# Patient Record
Sex: FEMALE | Race: WHITE | NOT HISPANIC OR LATINO | Employment: FULL TIME | URBAN - NONMETROPOLITAN AREA
[De-identification: names, ages, dates, MRNs, and addresses within clinical notes are randomized per-mention and may not be internally consistent; named-entity substitution may affect disease eponyms.]

---

## 2017-08-21 ENCOUNTER — OFFICE VISIT (OUTPATIENT)
Dept: FAMILY MEDICINE | Facility: OTHER | Age: 47
End: 2017-08-21
Payer: COMMERCIAL

## 2017-08-21 VITALS
WEIGHT: 245 LBS | RESPIRATION RATE: 20 BRPM | TEMPERATURE: 97 F | BODY MASS INDEX: 36.29 KG/M2 | HEIGHT: 69 IN | HEART RATE: 80 BPM | DIASTOLIC BLOOD PRESSURE: 76 MMHG | SYSTOLIC BLOOD PRESSURE: 120 MMHG

## 2017-08-21 DIAGNOSIS — B35.1 ONYCHOMYCOSIS: Primary | ICD-10-CM

## 2017-08-21 DIAGNOSIS — R21 RASH AND NONSPECIFIC SKIN ERUPTION: ICD-10-CM

## 2017-08-21 DIAGNOSIS — Z79.899 HIGH RISK MEDICATION USE: ICD-10-CM

## 2017-08-21 LAB
ALBUMIN SERPL-MCNC: 3.5 G/DL (ref 3.4–5)
ALP SERPL-CCNC: 74 U/L (ref 40–150)
ALT SERPL W P-5'-P-CCNC: 20 U/L (ref 0–50)
AST SERPL W P-5'-P-CCNC: 16 U/L (ref 0–45)
BILIRUB DIRECT SERPL-MCNC: 0.1 MG/DL (ref 0–0.2)
BILIRUB SERPL-MCNC: 0.3 MG/DL (ref 0.2–1.3)
PROT SERPL-MCNC: 7.2 G/DL (ref 6.8–8.8)

## 2017-08-21 PROCEDURE — 36415 COLL VENOUS BLD VENIPUNCTURE: CPT | Performed by: NURSE PRACTITIONER

## 2017-08-21 PROCEDURE — 99213 OFFICE O/P EST LOW 20 MIN: CPT | Performed by: NURSE PRACTITIONER

## 2017-08-21 PROCEDURE — 80076 HEPATIC FUNCTION PANEL: CPT | Performed by: NURSE PRACTITIONER

## 2017-08-21 RX ORDER — TERBINAFINE HYDROCHLORIDE 250 MG/1
250 TABLET ORAL DAILY
Qty: 90 TABLET | Refills: 0 | Status: SHIPPED | OUTPATIENT
Start: 2017-08-21 | End: 2021-01-21

## 2017-08-21 RX ORDER — TRIAMCINOLONE ACETONIDE 1 MG/G
CREAM TOPICAL
Qty: 80 G | Refills: 0 | Status: SHIPPED | OUTPATIENT
Start: 2017-08-21 | End: 2021-01-21

## 2017-08-21 NOTE — NURSING NOTE
"Chief Complaint   Patient presents with     Fungal Infection     left foot, 2 toes       Initial /76  Pulse 80  Temp 97  F (36.1  C) (Tympanic)  Resp 20  Ht 5' 8.7\" (1.745 m)  Wt 245 lb (111.1 kg)  LMP 01/21/2014  Breastfeeding? No  BMI 36.5 kg/m2 Estimated body mass index is 36.5 kg/(m^2) as calculated from the following:    Height as of this encounter: 5' 8.7\" (1.745 m).    Weight as of this encounter: 245 lb (111.1 kg).  Medication Reconciliation: complete   ................Salomón Gonzalez LPN,   August 21, 2017,      11:46 AM,   [facility]    "

## 2017-08-21 NOTE — MR AVS SNAPSHOT
After Visit Summary   8/21/2017    Parrish Bray    MRN: [id number]           Patient Information     Date Of Birth          1970        Visit Information        Provider Department      8/21/2017 11:40 AM Bambi Mojica APRN CNP [facility]        Today's Diagnoses     Onychomycosis    -  1    Rash and nonspecific skin eruption          Care Instructions    Start the medication today.     Have your liver tested again in 6 weeks with a lab only blood test appointment.     Use the steroid cream until the rash is gone.  Use Benadryl for the itching.            Follow-ups after your visit        Future tests that were ordered for you today     Open Future Orders        Priority Expected Expires Ordered    **Hepatic panel FUTURE 2mo Routine 10/2/2017 12/19/2017 8/21/2017            Who to contact     If you have questions or need follow up information about today's clinic visit or your schedule please contact [facility] directly at [phone].  Normal or non-critical lab and imaging results will be communicated to you by [employer], letter or phone within 4 business days after the clinic has received the results. If you do not hear from us within 7 days, please contact the clinic through [employer] or phone. If you have a critical or abnormal lab result, we will notify you by phone as soon as possible.  Submit refill requests through [employer] or call your pharmacy and they will forward the refill request to us. Please allow 3 business days for your refill to be completed.          Additional Information About Your Visit        [employer] Information     [employer] gives you secure access to your electronic health record. If you see a primary care provider, you can also send messages to your care team and make appointments. If you have questions, please call your primary care clinic.  If you do not have a primary care provider, please call [phone] and they will assist you.    "     Care EveryWhere ID     This is your Care EveryWhere ID. This could be used by other organizations to access your [facility] medical records  [id number]        Your Vitals Were     Pulse Temperature Respirations Height Last Period Breastfeeding?    80 97  F (36.1  C) (Tympanic) 20 5' 8.7\" (1.745 m) 01/21/2014 No    BMI (Body Mass Index)                   36.5 kg/m2            Blood Pressure from Last 3 Encounters:   08/21/17 120/76   01/27/16 122/78   01/12/16 126/78    Weight from Last 3 Encounters:   08/21/17 245 lb (111.1 kg)   01/27/16 255 lb 11.2 oz (116 kg)   01/12/16 255 lb (115.7 kg)              We Performed the Following     Hepatic panel (Albumin, ALT, AST, Bili, Alk Phos, TP)          Today's Medication Changes          These changes are accurate as of: 8/21/17 12:23 PM.  If you have any questions, ask your nurse or doctor.               Start taking these medicines.        Dose/Directions    terbinafine 250 MG tablet   Commonly known as:  lamISIL   Used for:  Onychomycosis   Started by:  Bambi Mojica APRN CNP        Dose:  250 mg   Take 1 tablet (250 mg) by mouth daily   Quantity:  90 tablet   Refills:  0       triamcinolone 0.1 % cream   Commonly known as:  KENALOG   Used for:  Rash and nonspecific skin eruption   Started by:  Bambi Mojica APRN CNP        Apply sparingly to affected area three times daily as needed   Quantity:  80 g   Refills:  0            Where to get your medicines      These medications were sent to [facility] [place], [place] - [address]   [address] [facility] [address]     Phone:  [phone]     terbinafine 250 MG tablet    triamcinolone 0.1 % cream                Primary Care Provider Office Phone # Fax #    Kimo Phillips [phone]       [address] [facility] [address]        Equal Access to Services     DIMA CALHOUN AH: Hadii aad ku hadasho Soomaali, waaxda luqadaha, qaybta kaalmachristy macedo, tati styles " lafifi holloway. So [facility] [phone].    ATENCIÓN: Si habla jeevan, tiene a david disposición servicios gratuitos de asistencia lingüística. Claritza al [phone].    We comply with applicable federal civil rights laws and [place] laws. We do not discriminate on the basis of race, color, national origin, age, disability sex, sexual orientation or gender identity.            Thank you!     Thank you for choosing [facility]  for your care. Our goal is always to provide you with excellent care. Hearing back from our patients is one way we can continue to improve our services. Please take a few minutes to complete the written survey that you may receive in the mail after your visit with us. Thank you!             Your Updated Medication List - Protect others around you: Learn how to safely use, store and throw away your medicines at www.disposemymeds.org.          This list is accurate as of: 8/21/17 12:23 PM.  Always use your most recent med list.                   Brand Name Dispense Instructions for use Diagnosis    terbinafine 250 MG tablet    lamISIL    90 tablet    Take 1 tablet (250 mg) by mouth daily    Onychomycosis       triamcinolone 0.1 % cream    KENALOG    80 g    Apply sparingly to affected area three times daily as needed    Rash and nonspecific skin eruption

## 2017-08-21 NOTE — PROGRESS NOTES
SUBJECTIVE:   Parrish Bray is a 47 year old female who presents to clinic today for the following health issues:      TOE FUNGUS - 2 TOENAILS  -has tried otc meds for 1 yr without improvement.     She also has a facial rash.  Noticed it after doing yard work this week. It is very itchy, no drainage.     No fevers/chills.     Problem list and histories reviewed & adjusted, as indicated.  Additional history: none    Patient Active Problem List   Diagnosis     Allergic rhinitis     CARDIOVASCULAR SCREENING; LDL GOAL LESS THAN 160     Menorrhagia     Endometriosis     Past Surgical History:   Procedure Laterality Date     ABDOMEN SURGERY       CYSTOSCOPY  2/7/2014    Procedure: CYSTOSCOPY;;  Surgeon: Jessy Palomares MD;  Location: [facility] OR      TOOTH EXTRACTION W/FORCEP      x5 wisdom teeth.     HYSTERECTOMY TOTAL ABDOMINAL, BILATERAL SALPINGO-OOPHORECTOMY, COMBINED  2/7/2014    Procedure: COMBINED HYSTERECTOMY TOTAL ABDOMINAL, SALPINGO-OOPHORECTOMY;;  Surgeon: Jessy Palomares MD;  Location: [facility] OR     HYSTERECTOMY, PAP NO LONGER INDICATED       LAPAROSCOPY DIAGNOSTIC (GYN)  2/7/2014    Procedure: LAPAROSCOPY DIAGNOSTIC (GYN);  Diagnositc Laparoscopy with Lysis of  Adhesions,  Exploratory Laparotomy, Total Abdominal   Hysterectomy Bilateral Salpingo-Oopherctomy, Lysis of Adhesions(open), Ureterolysis,Cystoscopy, Protoscopy.  Anesthesia General with Block;  Surgeon: Jessy Palomares MD;  Location: [facility] OR       Social History   Substance Use Topics     Smoking status: Never Smoker     Smokeless tobacco: Never Used      Comment: no smoking in the home     Alcohol use Yes      Comment: Once or twice a month     Family History   Problem Relation Age of Onset     CANCER Father      prostate,and bladder     HEART DISEASE Father      stents     Arthritis Father      Coronary Artery Disease Father      Hypertension Father      Prostate Cancer Father      Other Cancer Father      Bladder     CEREBROVASCULAR DISEASE Father   "    Hypertension Mother      CANCER Son      cancer of the sinus, rhabdomyoscarcoma     Breast Cancer Maternal Aunt          Current Outpatient Prescriptions   Medication Sig Dispense Refill     No medications                 Allergies   Allergen Reactions     Sulfa Drugs Rash     BP Readings from Last 3 Encounters:   08/21/17 120/76   01/27/16 122/78   01/12/16 126/78    Wt Readings from Last 3 Encounters:   08/21/17 245 lb (111.1 kg)   01/27/16 255 lb 11.2 oz (116 kg)   01/12/16 255 lb (115.7 kg)            Reviewed and updated as needed this visit by clinical staffTobacco  Allergies  Meds  Med Hx  Surg Hx  Fam Hx  Soc Hx      Reviewed and updated as needed this visit by Provider         ROS:  C: NEGATIVE for fever, chills, change in weight  INTEGUMENTARY/SKIN: facial rash as above, toenail fungus as above   E/M: NEGATIVE for ear, mouth and throat problems  R: NEGATIVE for significant cough or SOB  CV: NEGATIVE for chest pain, palpitations or peripheral edema    OBJECTIVE:     /76  Pulse 80  Temp 97  F (36.1  C) (Tympanic)  Resp 20  Ht 5' 8.7\" (1.745 m)  Wt 245 lb (111.1 kg)  LMP 01/21/2014  Breastfeeding? No  BMI 36.5 kg/m2  Body mass index is 36.5 kg/(m^2).  GENERAL: healthy, alert and no distress  SKIN: raised, red rash on the right side of her face, consistent with a plant based dermatitis. onychomycosis right great toe.     Diagnostic Test Results:  none     ASSESSMENT/PLAN:         1. Onychomycosis  Discussed treatment options.  She wants to try oral treatments.  Discussed risks/benefits.  Will check liver function today and 6 weeks into treatment.   - terbinafine (LAMISIL) 250 MG tablet; Take 1 tablet (250 mg) by mouth daily  Dispense: 90 tablet; Refill: 0  - Hepatic panel (Albumin, ALT, AST, Bili, Alk Phos, TP)  - **Hepatic panel FUTURE 2mo; Future    2. Rash and nonspecific skin eruption  Likely a plant based dermatitis.  Will treat with steroid cream as prescribed.   - triamcinolone " (KENALOG) 0.1 % cream; Apply sparingly to affected area three times daily as needed  Dispense: 80 g; Refill: 0    3. High risk medication use  - Hepatic panel (Albumin, ALT, AST, Bili, Alk Phos, TP)  - Hepatic panel FUTURE 2mo; Future    See Patient Instructions    MIRIAM Hernandez [facility]

## 2017-08-21 NOTE — PATIENT INSTRUCTIONS
Start the medication today.     Have your liver tested again in 6 weeks with a lab only blood test appointment.     Use the steroid cream until the rash is gone.  Use Benadryl for the itching.

## 2017-10-11 DIAGNOSIS — Z79.899 HIGH RISK MEDICATION USE: ICD-10-CM

## 2017-10-11 DIAGNOSIS — B35.1 ONYCHOMYCOSIS: ICD-10-CM

## 2017-10-11 LAB
ALBUMIN SERPL-MCNC: 3.8 G/DL (ref 3.4–5)
ALP SERPL-CCNC: 71 U/L (ref 40–150)
ALT SERPL W P-5'-P-CCNC: 20 U/L (ref 0–50)
AST SERPL W P-5'-P-CCNC: 13 U/L (ref 0–45)
BILIRUB DIRECT SERPL-MCNC: <0.1 MG/DL (ref 0–0.2)
BILIRUB SERPL-MCNC: 0.2 MG/DL (ref 0.2–1.3)
PROT SERPL-MCNC: 7.6 G/DL (ref 6.8–8.8)

## 2017-10-11 PROCEDURE — 80076 HEPATIC FUNCTION PANEL: CPT | Performed by: NURSE PRACTITIONER

## 2017-10-11 PROCEDURE — 36415 COLL VENOUS BLD VENIPUNCTURE: CPT | Performed by: NURSE PRACTITIONER

## 2019-09-28 ENCOUNTER — HEALTH MAINTENANCE LETTER (OUTPATIENT)
Age: 49
End: 2019-09-28

## 2020-03-15 ENCOUNTER — HEALTH MAINTENANCE LETTER (OUTPATIENT)
Age: 50
End: 2020-03-15

## 2021-01-10 ENCOUNTER — HEALTH MAINTENANCE LETTER (OUTPATIENT)
Age: 51
End: 2021-01-10

## 2021-01-21 ENCOUNTER — OFFICE VISIT (OUTPATIENT)
Dept: FAMILY MEDICINE | Facility: OTHER | Age: 51
End: 2021-01-21
Payer: COMMERCIAL

## 2021-01-21 ENCOUNTER — ANCILLARY PROCEDURE (OUTPATIENT)
Dept: GENERAL RADIOLOGY | Facility: OTHER | Age: 51
End: 2021-01-21
Attending: FAMILY MEDICINE
Payer: COMMERCIAL

## 2021-01-21 VITALS
WEIGHT: 262.5 LBS | DIASTOLIC BLOOD PRESSURE: 82 MMHG | RESPIRATION RATE: 16 BRPM | BODY MASS INDEX: 39.1 KG/M2 | HEART RATE: 84 BPM | TEMPERATURE: 98 F | OXYGEN SATURATION: 98 % | SYSTOLIC BLOOD PRESSURE: 128 MMHG

## 2021-01-21 DIAGNOSIS — R10.11 RUQ ABDOMINAL PAIN: ICD-10-CM

## 2021-01-21 DIAGNOSIS — Z13.220 SCREENING FOR HYPERLIPIDEMIA: ICD-10-CM

## 2021-01-21 DIAGNOSIS — Z11.59 ENCOUNTER FOR HEPATITIS C SCREENING TEST FOR LOW RISK PATIENT: ICD-10-CM

## 2021-01-21 DIAGNOSIS — R07.81 RIB PAIN ON RIGHT SIDE: Primary | ICD-10-CM

## 2021-01-21 DIAGNOSIS — R07.81 RIB PAIN ON RIGHT SIDE: ICD-10-CM

## 2021-01-21 DIAGNOSIS — Z12.31 VISIT FOR SCREENING MAMMOGRAM: ICD-10-CM

## 2021-01-21 DIAGNOSIS — R91.8 RIGHT LOWER LOBE LUNG MASS: ICD-10-CM

## 2021-01-21 LAB
ALBUMIN SERPL-MCNC: 3.7 G/DL (ref 3.4–5)
ALP SERPL-CCNC: 75 U/L (ref 40–150)
ALT SERPL W P-5'-P-CCNC: 36 U/L (ref 0–50)
ANION GAP SERPL CALCULATED.3IONS-SCNC: 2 MMOL/L (ref 3–14)
AST SERPL W P-5'-P-CCNC: 18 U/L (ref 0–45)
BILIRUB SERPL-MCNC: 0.3 MG/DL (ref 0.2–1.3)
BUN SERPL-MCNC: 16 MG/DL (ref 7–30)
CALCIUM SERPL-MCNC: 9.1 MG/DL (ref 8.5–10.1)
CHLORIDE SERPL-SCNC: 108 MMOL/L (ref 94–109)
CHOLEST SERPL-MCNC: 160 MG/DL
CO2 SERPL-SCNC: 30 MMOL/L (ref 20–32)
CREAT SERPL-MCNC: 1.06 MG/DL (ref 0.52–1.04)
GFR SERPL CREATININE-BSD FRML MDRD: 61 ML/MIN/{1.73_M2}
GLUCOSE SERPL-MCNC: 86 MG/DL (ref 70–99)
HDLC SERPL-MCNC: 55 MG/DL
LDLC SERPL CALC-MCNC: 76 MG/DL
NONHDLC SERPL-MCNC: 105 MG/DL
POTASSIUM SERPL-SCNC: 4 MMOL/L (ref 3.4–5.3)
PROT SERPL-MCNC: 7.6 G/DL (ref 6.8–8.8)
SODIUM SERPL-SCNC: 140 MMOL/L (ref 133–144)
TRIGL SERPL-MCNC: 143 MG/DL

## 2021-01-21 PROCEDURE — 80053 COMPREHEN METABOLIC PANEL: CPT | Performed by: FAMILY MEDICINE

## 2021-01-21 PROCEDURE — 86803 HEPATITIS C AB TEST: CPT | Performed by: FAMILY MEDICINE

## 2021-01-21 PROCEDURE — 99204 OFFICE O/P NEW MOD 45 MIN: CPT | Performed by: FAMILY MEDICINE

## 2021-01-21 PROCEDURE — 36415 COLL VENOUS BLD VENIPUNCTURE: CPT | Performed by: FAMILY MEDICINE

## 2021-01-21 PROCEDURE — 80061 LIPID PANEL: CPT | Performed by: FAMILY MEDICINE

## 2021-01-21 PROCEDURE — 71101 X-RAY EXAM UNILAT RIBS/CHEST: CPT | Mod: RT | Performed by: RADIOLOGY

## 2021-01-21 ASSESSMENT — PAIN SCALES - GENERAL: PAINLEVEL: MILD PAIN (3)

## 2021-01-21 NOTE — RESULT ENCOUNTER NOTE
Parrish,    Good news is that the radiologist thought the lump was a granuloma like I did (not typically dangerous).  The bad news is that he doesn't see a rib fracture.  Try the pain medication we discussed.  If your labs are normal, I would recommend just monitoring for a week or two with more regular pain medication.  If improving, great!  If not, then we can consider additional imaging.    Have a nice day!    Dr. Lantigua

## 2021-01-21 NOTE — PATIENT INSTRUCTIONS
Thank you for visiting [facility]    We'll let you know your lab and X-ray results as soon as we can.    Depending on results, we may need to do additional testing.    OK to alternate tylenol 1000  Mg  with ibuprofen 600-800 mg, taking something every 3 hours for pain control.      Contact us or return if questions or concerns.     Have a nice day!    Dr. Lantigua     Return in about 2 weeks (around 2/4/2021), or if symptoms worsen or fail to improve.      If you need medication refills, please contact your pharmacy 3 days before your prescriptions runs out or download the [facility] vidhya for your smart phone.   If you are out of refills, your pharmacy will contact contact the clinic.                                     Mychart assistance [phone]

## 2021-01-21 NOTE — PROGRESS NOTES
Assessment & Plan       ICD-10-CM    1. Rib pain on right side  R07.81 Comprehensive metabolic panel (BMP + Alb, Alk Phos, ALT, AST, Total. Bili, TP)     XR Ribs & Chest Right G/E 3 Views     amitriptyline (ELAVIL) 25 MG tablet   2. RUQ abdominal pain  R10.11 Comprehensive metabolic panel (BMP + Alb, Alk Phos, ALT, AST, Total. Bili, TP)     XR Ribs & Chest Right G/E 3 Views     amitriptyline (ELAVIL) 25 MG tablet   3. Right lower lobe lung mass  R91.8    4. Visit for screening mammogram  Z12.31 MA SCREENING DIGITAL BILAT - Future  (s+30)   5. Screening for hyperlipidemia  Z13.220 Lipid panel reflex to direct LDL Non-fasting   6. Encounter for hepatitis C screening test for low risk patient  Z11.59 Hepatitis C Screen Reflex to HCV RNA Quant and Genotype      1, 2, 3.  Exact etiology of her symptoms is not under percent clear.  Somewhat suspicious for rib fracture, but no obvious findings on rib films today.  Incidental finding of likely granuloma in her lung was discussed with patient.  If radiology does not concur this is a granuloma, would have a low threshold for CT imaging of her chest.  Will treat with pain management for now.  Alternate Tylenol and ibuprofen for pain control.  We will also try some amitriptyline at night to see if there may be a neuropathic component to the pain.  If not improving after 1 to 2 weeks, will need to consider CT imaging.  Given mildly positive Saravia sign on exam, will also evaluate liver function.  History is not consistent with gallbladder disease.  Patient is low risk for lung neoplasia based upon history.  4.  Mammogram ordered.  5.  We will screen with her other blood work.  6.  Screening ordered.    Portions of this note were completed using Dragon dictation software.  Although reviewed, there may be typographical and other inadvertent errors that remain.           Discussion of management or test interpretation with external physician/other qualified healthcare  professional/appropriate source -discussed x-ray findings with Sahil Gloria.      45 minutes spent on the date of the encounter doing chart review, history and exam, documentation and further activities as noted above           Patient Instructions   Thank you for visiting [facility]    We'll let you know your lab and X-ray results as soon as we can.    Depending on results, we may need to do additional testing.    OK to alternate tylenol 1000  Mg  with ibuprofen 600-800 mg, taking something every 3 hours for pain control.      Contact us or return if questions or concerns.     Have a nice day!    Dr. Lantigua     Return in about 2 weeks (around 2/4/2021), or if symptoms worsen or fail to improve.      If you need medication refills, please contact your pharmacy 3 days before your prescriptions runs out or download the [facility] vidhya for your smart phone.   If you are out of refills, your pharmacy will contact contact the clinic.                                     Mychart assistance [phone]                       Return in about 2 weeks (around 2/4/2021), or if symptoms worsen or fail to improve.    Edmond Lantigua MD, MD  [facility] is a 51 year old female who presents to clinic today for the following health issues accompanied by her :    HPI     Rib pain  Onset/Duration: 1 week  Description  Location: ribs - right  Redness: no  Pain: YES  Intensity:  mild, moderate- depends on movement   Progression of Symptoms:  waxing and waning  Accompanying signs and symptoms:   Fevers: no  Numbness/tingling/weakness: no  History  Trauma to the area: not that she's aware of  Recent illness:  no  Previous similar problem: no  Previous evaluation:  no  Precipitating or alleviating factors:  Aggravating factors include: any movement- deep breaths   Therapies tried and outcome: rest/inactivity    Pt reports pain on her right side near a  rib.  Not sure what happened.  Doesn't recall injuring it.    Remembers shoveling a week ago, but  Not sure if it was related  To that.      Pain has been present  For about a week.  Feels like a sharp pain most of the time.  When sitting, it's more a dull pain.  Worse with deep breaths.      No recollection of injury/fall.  Was present on waking one day.  Pain isn't really getting worse, but not really improving much.      Has tried tylenol/ibuprofen infrequently.  Some  Improvement with that.      No rash noted.        Review of Systems   Constitutional, HEENT, cardiovascular, pulmonary, gi and gu systems are negative, except as otherwise noted.      Objective    /82   Pulse 84   Temp 98  F (36.7  C) (Temporal)   Resp 16   Wt 119.1 kg (262 lb 8 oz)   LMP 01/21/2014   SpO2 98%   BMI 39.10 kg/m    Body mass index is 39.1 kg/m .  Physical Exam   GENERAL: healthy, alert and no distress  NECK: no adenopathy, no asymmetry, masses, or scars and thyroid normal to palpation  RESP: lungs clear to auscultation - no rales, rhonchi or wheezes  RESP: localized tenderness near 8th rib and anterior axillary line.  CV: regular rate and rhythm, normal S1 S2, no S3 or S4, no murmur, click or rub, no peripheral edema and peripheral pulses strong  ABDOMEN: mild RUQ tenderness referred to area of rib pain.  MS: no gross musculoskeletal defects noted, no edema    Xray - Reviewed and interpreted by me.  No obvious rib abnormalities.  There is a mass noted in her right lower lung consistent with a granuloma.  Results for orders placed or performed in visit on 01/21/21 (from the past 24 hour(s))   Lipid panel reflex to direct LDL Non-fasting   Result Value Ref Range    Cholesterol 160 <200 mg/dL    Triglycerides 143 <150 mg/dL    HDL Cholesterol 55 >49 mg/dL    LDL Cholesterol Calculated 76 <100 mg/dL    Non HDL Cholesterol 105 <130 mg/dL   Comprehensive metabolic panel (BMP + Alb, Alk Phos, ALT, AST, Total. Bili, TP)   Result  Value Ref Range    Sodium 140 133 - 144 mmol/L    Potassium 4.0 3.4 - 5.3 mmol/L    Chloride 108 94 - 109 mmol/L    Carbon Dioxide 30 20 - 32 mmol/L    Anion Gap 2 (L) 3 - 14 mmol/L    Glucose 86 70 - 99 mg/dL    Urea Nitrogen 16 7 - 30 mg/dL    Creatinine 1.06 (H) 0.52 - 1.04 mg/dL    GFR Estimate 61 >60 mL/min/[1.73_m2]    GFR Estimate If Black 70 >60 mL/min/[1.73_m2]    Calcium 9.1 8.5 - 10.1 mg/dL    Bilirubin Total 0.3 0.2 - 1.3 mg/dL    Albumin 3.7 3.4 - 5.0 g/dL    Protein Total 7.6 6.8 - 8.8 g/dL    Alkaline Phosphatase 75 40 - 150 U/L    ALT 36 0 - 50 U/L    AST 18 0 - 45 U/L

## 2021-01-22 LAB — HCV AB SERPL QL IA: NONREACTIVE

## 2021-01-29 ENCOUNTER — APPOINTMENT (OUTPATIENT)
Dept: CT IMAGING | Facility: CLINIC | Age: 51
End: 2021-01-29
Attending: EMERGENCY MEDICINE
Payer: COMMERCIAL

## 2021-01-29 ENCOUNTER — HOSPITAL ENCOUNTER (OUTPATIENT)
Dept: MAMMOGRAPHY | Facility: CLINIC | Age: 51
Discharge: HOME | End: 2021-01-29
Attending: FAMILY MEDICINE | Admitting: FAMILY MEDICINE
Payer: COMMERCIAL

## 2021-01-29 ENCOUNTER — HOSPITAL ENCOUNTER (EMERGENCY)
Facility: CLINIC | Age: 51
Discharge: HOME | End: 2021-01-29
Attending: EMERGENCY MEDICINE | Admitting: EMERGENCY MEDICINE
Payer: COMMERCIAL

## 2021-01-29 VITALS
WEIGHT: 260 LBS | TEMPERATURE: 98.2 F | HEART RATE: 73 BPM | BODY MASS INDEX: 38.73 KG/M2 | OXYGEN SATURATION: 97 % | RESPIRATION RATE: 16 BRPM | DIASTOLIC BLOOD PRESSURE: 98 MMHG | SYSTOLIC BLOOD PRESSURE: 131 MMHG

## 2021-01-29 DIAGNOSIS — Z12.31 VISIT FOR SCREENING MAMMOGRAM: ICD-10-CM

## 2021-01-29 DIAGNOSIS — R07.81 RIB PAIN ON RIGHT SIDE: ICD-10-CM

## 2021-01-29 DIAGNOSIS — R10.9 FLANK PAIN: ICD-10-CM

## 2021-01-29 DIAGNOSIS — R31.21 ASYMPTOMATIC MICROSCOPIC HEMATURIA: ICD-10-CM

## 2021-01-29 LAB
ALBUMIN SERPL-MCNC: 3.8 G/DL (ref 3.4–5)
ALBUMIN UR-MCNC: NEGATIVE MG/DL
ALP SERPL-CCNC: 72 U/L (ref 40–150)
ALT SERPL W P-5'-P-CCNC: 49 U/L (ref 0–50)
ANION GAP SERPL CALCULATED.3IONS-SCNC: 3 MMOL/L (ref 3–14)
APPEARANCE UR: CLEAR
AST SERPL W P-5'-P-CCNC: 22 U/L (ref 0–45)
BACTERIA #/AREA URNS HPF: ABNORMAL /HPF
BASOPHILS # BLD AUTO: 0 10E9/L (ref 0–0.2)
BASOPHILS NFR BLD AUTO: 0.8 %
BILIRUB SERPL-MCNC: 0.3 MG/DL (ref 0.2–1.3)
BILIRUB UR QL STRIP: NEGATIVE
BUN SERPL-MCNC: 16 MG/DL (ref 7–30)
CALCIUM SERPL-MCNC: 9 MG/DL (ref 8.5–10.1)
CHLORIDE SERPL-SCNC: 107 MMOL/L (ref 94–109)
CO2 SERPL-SCNC: 28 MMOL/L (ref 20–32)
COLOR UR AUTO: YELLOW
CREAT SERPL-MCNC: 1 MG/DL (ref 0.52–1.04)
DIFFERENTIAL METHOD BLD: NORMAL
EOSINOPHIL NFR BLD AUTO: 3.1 %
ERYTHROCYTE [DISTWIDTH] IN BLOOD BY AUTOMATED COUNT: 12.4 % (ref 10–15)
GFR SERPL CREATININE-BSD FRML MDRD: 65 ML/MIN/{1.73_M2}
GLUCOSE SERPL-MCNC: 106 MG/DL (ref 70–99)
GLUCOSE UR STRIP-MCNC: NEGATIVE MG/DL
HCT VFR BLD AUTO: 40.6 % (ref 35–47)
HGB BLD-MCNC: 13.7 G/DL (ref 11.7–15.7)
HGB UR QL STRIP: ABNORMAL
IMM GRANULOCYTES # BLD: 0 10E9/L (ref 0–0.4)
IMM GRANULOCYTES NFR BLD: 0.4 %
KETONES UR STRIP-MCNC: NEGATIVE MG/DL
LEUKOCYTE ESTERASE UR QL STRIP: NEGATIVE
LYMPHOCYTES # BLD AUTO: 1.5 10E9/L (ref 0.8–5.3)
LYMPHOCYTES NFR BLD AUTO: 28.5 %
MCH RBC QN AUTO: 30.9 PG (ref 26.5–33)
MCHC RBC AUTO-ENTMCNC: 33.7 G/DL (ref 31.5–36.5)
MCV RBC AUTO: 91 FL (ref 78–100)
MONOCYTES # BLD AUTO: 0.5 10E9/L (ref 0–1.3)
MONOCYTES NFR BLD AUTO: 8.9 %
MUCOUS THREADS #/AREA URNS LPF: PRESENT /LPF
NEUTROPHILS # BLD AUTO: 3 10E9/L (ref 1.6–8.3)
NEUTROPHILS NFR BLD AUTO: 58.3 %
NITRATE UR QL: NEGATIVE
NRBC # BLD AUTO: 0 10*3/UL
NRBC BLD AUTO-RTO: 0 /100
PH UR STRIP: 5 PH (ref 5–7)
PLATELET # BLD AUTO: 259 10E9/L (ref 150–450)
POTASSIUM SERPL-SCNC: 4.3 MMOL/L (ref 3.4–5.3)
PROT SERPL-MCNC: 7.5 G/DL (ref 6.8–8.8)
RBC # BLD AUTO: 4.44 10E12/L (ref 3.8–5.2)
RBC #/AREA URNS AUTO: 2 /HPF (ref 0–2)
SODIUM SERPL-SCNC: 138 MMOL/L (ref 133–144)
SOURCE: ABNORMAL
SP GR UR STRIP: 1.01 (ref 1–1.03)
SQUAMOUS #/AREA URNS AUTO: <1 /HPF (ref 0–1)
UROBILINOGEN UR STRIP-MCNC: 0 MG/DL (ref 0–2)
WBC # BLD AUTO: 5.2 10E9/L (ref 4–11)
WBC #/AREA URNS AUTO: <1 /HPF (ref 0–5)

## 2021-01-29 PROCEDURE — 80053 COMPREHEN METABOLIC PANEL: CPT | Performed by: EMERGENCY MEDICINE

## 2021-01-29 PROCEDURE — 250N000011 HC RX IP 250 OP 636: Performed by: EMERGENCY MEDICINE

## 2021-01-29 PROCEDURE — 77067 SCR MAMMO BI INCL CAD: CPT

## 2021-01-29 PROCEDURE — 96374 THER/PROPH/DIAG INJ IV PUSH: CPT | Performed by: EMERGENCY MEDICINE

## 2021-01-29 PROCEDURE — 99285 EMERGENCY DEPT VISIT HI MDM: CPT | Mod: 25 | Performed by: EMERGENCY MEDICINE

## 2021-01-29 PROCEDURE — 74176 CT ABD & PELVIS W/O CONTRAST: CPT

## 2021-01-29 PROCEDURE — 81001 URINALYSIS AUTO W/SCOPE: CPT | Performed by: EMERGENCY MEDICINE

## 2021-01-29 PROCEDURE — 99285 EMERGENCY DEPT VISIT HI MDM: CPT | Performed by: EMERGENCY MEDICINE

## 2021-01-29 PROCEDURE — 85025 COMPLETE CBC W/AUTO DIFF WBC: CPT | Performed by: EMERGENCY MEDICINE

## 2021-01-29 RX ORDER — HYDROCODONE BITARTRATE AND ACETAMINOPHEN 5; 325 MG/1; MG/1
1 TABLET ORAL EVERY 6 HOURS PRN
Qty: 12 TABLET | Refills: 0 | Status: SHIPPED | OUTPATIENT
Start: 2021-01-29 | End: 2021-02-01

## 2021-01-29 RX ORDER — HYDROMORPHONE HYDROCHLORIDE 1 MG/ML
0.5 INJECTION, SOLUTION INTRAMUSCULAR; INTRAVENOUS; SUBCUTANEOUS
Status: DISCONTINUED | OUTPATIENT
Start: 2021-01-29 | End: 2021-01-29 | Stop reason: HOSPADM

## 2021-01-29 RX ADMIN — HYDROMORPHONE HYDROCHLORIDE 0.5 MG: 1 INJECTION, SOLUTION INTRAMUSCULAR; INTRAVENOUS; SUBCUTANEOUS at 11:04

## 2021-01-29 NOTE — ED TRIAGE NOTES
S: Right side pain  B: Pain starts in right upper quadrant and wraps around into the ribs and back.   A: Ibuprofen taken this morning.  R: Ready for provider evaluation.

## 2021-01-29 NOTE — ED PROVIDER NOTES
History     Chief Complaint   Patient presents with     Rib Pain     HPI  Parrish Bray is a 51 year old female who presents to the ER with right-sided rib pain.  She says that for the last few weeks she has been having rib pain on her right side.  Initially started after she was shoveling the driveway and thought that she might have cracked a rib.  Saw her primary provider, and rib x-rays were performed but no fracture was noted.  She also says that her primary provider saw a granuloma in her lungs but did not think it was serious.  Also had some lab work done to check her liver and was told that it was fine.  She was given a prescription for amitriptyline to help her sleep, and she says that it does help at night, but she does wake up in the morning with the same pain.  Has also been alternating Tylenol and ibuprofen which slightly helps with her pain but she still notices discomfort.  It is worse with any movement, and says that it was very severe after she sneezed today.  She denies any fever, no shortness of breath or cough, no nausea or vomiting, no pain or burning with urination.    Allergies:  Allergies   Allergen Reactions     Sulfa Drugs Rash       Problem List:    Patient Active Problem List    Diagnosis Date Noted     Endometriosis 02/07/2014     Priority: Medium     Menorrhagia 11/08/2011     Priority: Medium     CARDIOVASCULAR SCREENING; LDL GOAL LESS THAN 160 10/31/2010     Priority: Medium     Allergic rhinitis 10/03/2002     Priority: Medium     Problem list name updated by automated process. Provider to review          Past Medical History:    Past Medical History:   Diagnosis Date     Allergic rhinitis, cause unspecified      Depressive disorder      NONSPECIFIC MEDICAL HISTORY        Past Surgical History:    Past Surgical History:   Procedure Laterality Date     ABDOMEN SURGERY       CYSTOSCOPY  2/7/2014    Procedure: CYSTOSCOPY;;  Surgeon: Jessy Palomares MD;  Location:  OR       TOOTH EXTRACTION W/FORCEP      x5 wisdom teeth.     HYSTERECTOMY TOTAL ABDOMINAL, BILATERAL SALPINGO-OOPHORECTOMY, COMBINED  2/7/2014    Procedure: COMBINED HYSTERECTOMY TOTAL ABDOMINAL, SALPINGO-OOPHORECTOMY;;  Surgeon: Jessy Palomares MD;  Location: [facility] OR     HYSTERECTOMY, PAP NO LONGER INDICATED       LAPAROSCOPY DIAGNOSTIC (GYN)  2/7/2014    Procedure: LAPAROSCOPY DIAGNOSTIC (GYN);  Diagnositc Laparoscopy with Lysis of  Adhesions,  Exploratory Laparotomy, Total Abdominal   Hysterectomy Bilateral Salpingo-Oopherctomy, Lysis of Adhesions(open), Ureterolysis,Cystoscopy, Protoscopy.  Anesthesia General with Block;  Surgeon: Jessy Palomares MD;  Location: [facility] OR       Family History:    Family History   Problem Relation Age of Onset     Cancer Father         prostate,and bladder     Heart Disease Father         stents     Arthritis Father      Coronary Artery Disease Father      Hypertension Father      Prostate Cancer Father      Other Cancer Father         Bladder     Cerebrovascular Disease Father      Hypertension Mother      Cancer Son         cancer of the sinus, rhabdomyoscarcoma     Breast Cancer Maternal Aunt        Social History:  Marital Status:   [2]  Social History     Tobacco Use     Smoking status: Never Smoker     Smokeless tobacco: Never Used     Tobacco comment: no smoking in the home   Substance Use Topics     Alcohol use: Yes     Comment: Once or twice a month     Drug use: No        Medications:         HYDROcodone-acetaminophen (NORCO) 5-325 MG tablet       amitriptyline (ELAVIL) 25 MG tablet          Review of Systems   All other systems reviewed and are negative.      Physical Exam   BP: (!) 131/99  Pulse: 98  Temp: 98.2  F (36.8  C)  Resp: 16  Weight: 117.9 kg (260 lb)  SpO2: 98 %      Physical Exam  Vitals signs and nursing note reviewed.   Constitutional:       General: She is not in acute distress.     Appearance: She is not diaphoretic.   HENT:      Head: Atraumatic.       Mouth/Throat:      Pharynx: No oropharyngeal exudate.   Eyes:      General: No scleral icterus.     Pupils: Pupils are equal, round, and reactive to light.   Cardiovascular:      Rate and Rhythm: Normal rate and regular rhythm.      Heart sounds: Normal heart sounds.   Pulmonary:      Effort: Pulmonary effort is normal. No respiratory distress.      Breath sounds: Normal breath sounds.   Abdominal:      General: Bowel sounds are normal.      Palpations: Abdomen is soft.      Tenderness: There is no abdominal tenderness. There is no right CVA tenderness, left CVA tenderness or guarding.   Musculoskeletal:         General: No tenderness.   Skin:     General: Skin is warm.      Findings: No lesion or rash.   Neurological:      Mental Status: She is alert.         ED Course        Procedures               Critical Care time:  none               Results for orders placed or performed during the hospital encounter of 01/29/21 (from the past 24 hour(s))   UA reflex to Microscopic and Culture    Specimen: Midstream Urine   Result Value Ref Range    Color Urine Yellow     Appearance Urine Clear     Glucose Urine Negative NEG^Negative mg/dL    Bilirubin Urine Negative NEG^Negative    Ketones Urine Negative NEG^Negative mg/dL    Specific Gravity Urine 1.011 1.003 - 1.035    Blood Urine Moderate (A) NEG^Negative    pH Urine 5.0 5.0 - 7.0 pH    Protein Albumin Urine Negative NEG^Negative mg/dL    Urobilinogen mg/dL 0.0 0.0 - 2.0 mg/dL    Nitrite Urine Negative NEG^Negative    Leukocyte Esterase Urine Negative NEG^Negative    Source Midstream Urine     RBC Urine 2 0 - 2 /HPF    WBC Urine <1 0 - 5 /HPF    Bacteria Urine Few (A) NEG^Negative /HPF    Squamous Epithelial /HPF Urine <1 0 - 1 /HPF    Mucous Urine Present (A) NEG^Negative /LPF   CBC with platelets differential   Result Value Ref Range    WBC 5.2 4.0 - 11.0 10e9/L    RBC Count 4.44 3.8 - 5.2 10e12/L    Hemoglobin 13.7 11.7 - 15.7 g/dL    Hematocrit 40.6 35.0 - 47.0 %     MCV 91 78 - 100 fl    MCH 30.9 26.5 - 33.0 pg    MCHC 33.7 31.5 - 36.5 g/dL    RDW 12.4 10.0 - 15.0 %    Platelet Count 259 150 - 450 10e9/L    Diff Method Automated Method     % Neutrophils 58.3 %    % Lymphocytes 28.5 %    % Monocytes 8.9 %    % Eosinophils 3.1 %    % Basophils 0.8 %    % Immature Granulocytes 0.4 %    Nucleated RBCs 0 0 /100    Absolute Neutrophil 3.0 1.6 - 8.3 10e9/L    Absolute Lymphocytes 1.5 0.8 - 5.3 10e9/L    Absolute Monocytes 0.5 0.0 - 1.3 10e9/L    Absolute Basophils 0.0 0.0 - 0.2 10e9/L    Abs Immature Granulocytes 0.0 0 - 0.4 10e9/L    Absolute Nucleated RBC 0.0    Comprehensive metabolic panel   Result Value Ref Range    Sodium 138 133 - 144 mmol/L    Potassium 4.3 3.4 - 5.3 mmol/L    Chloride 107 94 - 109 mmol/L    Carbon Dioxide 28 20 - 32 mmol/L    Anion Gap 3 3 - 14 mmol/L    Glucose 106 (H) 70 - 99 mg/dL    Urea Nitrogen 16 7 - 30 mg/dL    Creatinine 1.00 0.52 - 1.04 mg/dL    GFR Estimate 65 >60 mL/min/[1.73_m2]    GFR Estimate If Black 76 >60 mL/min/[1.73_m2]    Calcium 9.0 8.5 - 10.1 mg/dL    Bilirubin Total 0.3 0.2 - 1.3 mg/dL    Albumin 3.8 3.4 - 5.0 g/dL    Protein Total 7.5 6.8 - 8.8 g/dL    Alkaline Phosphatase 72 40 - 150 U/L    ALT 49 0 - 50 U/L    AST 22 0 - 45 U/L   CT Abdomen Pelvis w/o Contrast    Narrative    CT ABDOMEN AND PELVIS WITHOUT CONTRAST 1/29/2021 11:29 AM    CLINICAL HISTORY: Flank pain, kidney stone suspected. Right-sided rib  and flank pain.    TECHNIQUE: CT scan of the abdomen and pelvis was performed without IV  contrast. Multiplanar reformats were obtained. Dose reduction  techniques were used.    CONTRAST: None.    COMPARISON: CT abdomen/pelvis dated 1/12/2016.    FINDINGS:   LOWER CHEST: Calcified granuloma and calcified right hilar lymph nodes  are again noted. These indicate chronic granulomatous disease and are  stable since the prior CT. Visualized portions of the lung bases are  otherwise grossly clear. Otherwise, mediastinal contents  are  unremarkable.    HEPATOBILIARY: Normal.    PANCREAS: Normal.    SPLEEN: A few calcifications in the spleen indicate chronic  granulomatous disease. Spleen is otherwise unremarkable.    ADRENAL GLANDS: Normal.    KIDNEYS/BLADDER: Three tiny nonobstructive left intrarenal calculi are  noted. These measure up to 0.2 cm. There is mild prominence of the  bilateral intrarenal collecting systems, but no evidence for  hydroureter or ureteral calculus is seen. Urinary bladder is grossly  unremarkable. Small hyperdense lesion exophytically extending from mid  left kidney measuring up to 0.9 cm with an average density of 58  Hounsfield units (image 45 series 2) could represent a hyperdense cyst  or solid lesion. This was not present on the prior CT and I recommend  follow-up dynamic MRI or CT in approximately 3 months to ensure  stability and to evaluate the enhancement characteristics.    BOWEL: There is focal questionable thickening of the wall of the  proximal rectal/distal sigmoid colon (image 196 series 3). This could  represent a contraction in this region. Soft tissue infiltration such  as neoplasm is considered less likely. Recommend clinical correlation.  The colon is otherwise grossly of normal caliber. No pericolonic  inflammatory change to suggest acute diverticulitis. Appendix extends  posteriorly, inferiorly and medially from the cecum and is normal in  appearance. Small bowel is grossly of normal caliber and appearance.  Mild fecalization is seen in the distal small bowel. The stomach  contains a moderate to large amount of fluid and air, but is otherwise  unremarkable.    LYMPH NODES: No adenopathy is identified.    VASCULATURE: Minimal nonaneurysmal atherosclerosis is seen in the  right common iliac artery. Aorta is grossly normal in appearance.    PELVIC ORGANS: Uterus and ovaries are not seen, consistent with  surgical history.    OTHER: No free fluid or free air is seen in the peritoneal  cavity.    MUSCULOSKELETAL: No aggressive osseous lesions or acute osseous  fractures are seen.      Impression    IMPRESSION:   1.  Nonobstructive left intrarenal calculi are of doubtful clinical  significance and measure less than 0.3 cm.  2.  Mild prominent bilateral intrarenal collecting system consistency  without anton hydronephrosis. No hydroureter or ureteral calculus is  seen.  3.  Evidence of chronic granulomatous disease of the chest and of the  spleen as described above.  4.  Evaluation of the solid organs of the abdomen and pelvis is  limited due to lack of IV contrast.  5.  Etiology for patient's right-sided rib and flank pain is not  definitely seen.  6.  Hyperdense lesion exophytically extending from the mid left kidney  likely represents a hyperdense cyst but could also less likely  represent a solid lesion. This was not definitely seen on the prior CT  dated 1/12/2016 and I recommend follow-up dynamic CT examination or  MRI in 3 months.       Medications   HYDROmorphone (PF) (DILAUDID) injection 0.5 mg (0.5 mg Intravenous Given 1/29/21 1104)       Assessments & Plan (with Medical Decision Making)  Hellen is a 51-year-old female who presents to the ER with few weeks of right-sided rib pain see history and focused physical exam as above  51-year-old female in no acute distress, vital signs are stable and she is afebrile.  She is sitting up since she has pain with movement on the right side of her ribs and flank.  Does not have any pain with palpation or worsening of her symptoms during examination.  She does not have any overlying skin lesions or ecchymosis.  Does not have any crepitus or step-off of her ribs to indicate fracture.  Lung sounds are normal.  We will proceed with work-up, will get some lab work and urine sample, and determine what scan may be appropriate.  I suspect she may have a kidney stone that is too large to pass and may need lithotripsy.  She is agreeable to proceeding with this  work-up.  Lab results as above.  She does have hematuria but no evidence of pyuria.  CBC and chemistry are within normal limits.  CT abdomen pelvis was ordered to evaluate for suspected nephrolithiasis or ureterolithiasis.  Results as above.  There are several small calculi within the kidney.  On radiology reported indicates these are within the left kidney, but on personal review I see them within the right kidney.  Regardless, this should not be causing the patient any pain.  There are no urinary calculi.  The visible ribs are intact, no evidence of fracture.  Stable granulomatous disease.  Informed patient of the results as above.  She had relief with the IV Dilaudid that was given in the ER.  States that we did not find a specific cause of her pain.  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  I state that she may continue to use Tylenol or ibuprofen over-the-counter if she is going to be driving, but if she is going to be home and pain is severe, and she knows that she will not be driving, may use the Norco as prescribed.  I did encourage her to follow-up with Dr. Lantigua, who recommended that the patient follow-up 1 to 2 weeks after their initial visit, so he may reassess and determine if patient needs additional work-up, imaging, or referral for her symptoms.  I also encouraged the patient to return to the ER if she has any new or worsening symptoms, or if the pain medication does not help.  She understands and agrees and is discharged in no acute distress.     I have reviewed the nursing notes.    I have reviewed the findings, diagnosis, plan and need for follow up with the patient.       Discharge Medication List as of 1/29/2021 12:08 PM      START taking these medications    Details   HYDROcodone-acetaminophen (NORCO) 5-325 MG tablet Take 1 tablet  by mouth every 6 hours as needed for severe pain, Disp-12 tablet, R-0, Local Print             Final diagnoses:   Rib pain on right side   Flank pain   Asymptomatic microscopic hematuria       1/29/2021   [facility] EMERGENCY DEPT     Jaci Oconnor DO  01/29/21 1812

## 2021-01-29 NOTE — DISCHARGE INSTRUCTIONS
Your CT scan showed some stones in your kidney, but none in the ureter which would be the likely culprit of pain.  The ribs that were seen on the CT scan today did not show any signs of fracture.    A prescription for Brewer was sent to the pharmacy.  You may take 1 tablet of this every 6 hours as needed for severe pain.  Be aware that this medication may make you drowsy, so do not drive or drink alcohol while taking this medicine.    If you are working or have more mild or moderate pain, you may continue to alternate the Tylenol and ibuprofen per bottle instructions.  You may also try over-the-counter lidocaine patches over the area that is bothersome to see if this will help alleviate some of your symptoms.  Lidocaine patches are okay to wear if you are working and driving.    You should follow-up with Dr. Lantigua in 1 week.  He may recommend further work-up or evaluation for your symptoms at that time.    Return to the ER if you have any new or worsening symptoms, or if the prescribed medication does not help alleviate your symptoms.

## 2021-01-30 NOTE — RESULT ENCOUNTER NOTE
Parrish,    Some additional views are needed to complete your mammogram.  Please let me know if you need help setting this up.    Have a nice day!    Dr. Lantigua

## 2021-02-18 ENCOUNTER — HOSPITAL ENCOUNTER (OUTPATIENT)
Dept: MAMMOGRAPHY | Facility: CLINIC | Age: 51
End: 2021-02-18
Attending: FAMILY MEDICINE
Payer: COMMERCIAL

## 2021-02-18 ENCOUNTER — HOSPITAL ENCOUNTER (OUTPATIENT)
Dept: ULTRASOUND IMAGING | Facility: CLINIC | Age: 51
End: 2021-02-18
Attending: FAMILY MEDICINE
Payer: COMMERCIAL

## 2021-02-18 DIAGNOSIS — R92.8 ABNORMAL MAMMOGRAM: ICD-10-CM

## 2021-02-18 PROCEDURE — 76642 ULTRASOUND BREAST LIMITED: CPT | Mod: RT

## 2021-02-18 PROCEDURE — G0279 TOMOSYNTHESIS, MAMMO: HCPCS

## 2021-07-04 ENCOUNTER — NURSE TRIAGE (OUTPATIENT)
Dept: NURSING | Facility: CLINIC | Age: 51
End: 2021-07-04

## 2021-07-04 NOTE — TELEPHONE ENCOUNTER
Data:  Pt stubbed their little toe on left foot on Wednesday. Pt reports today toe is swollen and there is bruising extending out to the other toes. Pt indicates ROM is decreased. Pt rates their pain 2-3/10 which is what it's been since the initial injury and unimproved. Pt is able to walk on foot but has a limp. No deformities noted on toe.          Action:   Per protocol pt to be seen within 3 days. Care advise given. Call was transferred to .      Response:   Pt verbalizes understanding and agrees with plan of care.    Quincy Carrillo RN 7/4/2021 4:59 PM  [facility] Nurse Advisor       Reason for Disposition    [1] After 3 days AND [2] pain not improved    Additional Information    Negative: [1] Major bleeding (e.g., spurting blood) AND [2] can't be stopped    Negative: Foot or ankle injury    Negative: Looks infected    Negative: Amputated toe    Negative: Skin is split open or gaping (or length > 1/2 inch or 12 mm)    Negative: [1] Bleeding AND [2] won't stop after 10 minutes of direct pressure (using correct technique)    Negative: [1] Dirt in the wound AND [2] not removed with 15 minutes of scrubbing    Negative: Sounds like a serious injury to the triager    Negative: [1] SEVERE pain AND [2] not improved 2 hours after pain medicine/ice packs    Negative: [1] MODERATE-SEVERE pain AND [2] blood present under the toenail    Negative: Looks like a broken bone (e.g., crooked or deformed)    Negative: Looks like a dislocated joint (e.g., crooked or deformed)    Negative: Toenail is completely torn off (toenail avulsion)    Negative: Base of toenail has popped out of the skin fold (nail base dislocation)    Negative: [1] No prior tetanus shots (or is not fully vaccinated) AND [2] any wound (e.g., cut, scrape)    Negative: [1] Toe injury AND [2] bad limp or can't wear shoes/sandals    Negative: [1] HIV positive or severe immunodeficiency (severely weak immune system) AND [2] DIRTY cut or scrape     Negative: [1] Last tetanus shot > 5 years ago AND [2] DIRTY cut or scrape    Negative: [1] Last tetanus shot > 10 years ago AND [2] CLEAN cut or scrape (e.g., object AND skin were clean)    Negative: [1] Has diabetes (diabetes mellitus) AND [2] small cut or scrape    Negative: Large swelling or bruise    Negative: [1] Toenail comes off or is almost off AND [2] follows old injury AND [3] wants doctor to remove it    Protocols used: TOE INJURY-A-AH    COVID 19 Nurse Triage Plan/Patient Instructions    Please be aware that novel coronavirus (COVID-19) may be circulating in the community. If you develop symptoms such as fever, cough, or SOB or if you have concerns about the presence of another infection including coronavirus (COVID-19), please contact your health care provider or visit [email or web address].     Disposition/Instructions    In-Person Visit with provider recommended. Reference Visit Selection Guide.    Thank you for taking steps to prevent the spread of this virus.  o Limit your contact with others.  o Wear a simple mask to cover your cough.  o Wash your hands well and often.    Resources    [facility]: About COVID-19: [email or web address]    CDC: What to Do If You're Sick: www.cdc.gov/coronavirus/2019-ncov/about/steps-when-sick.html    CDC: Ending Home Isolation: www.cdc.gov/coronavirus/2019-ncov/hcp/disposition-in-home-patients.html     CDC: Caring for Someone: www.cdc.gov/coronavirus/2019-ncov/if-you-are-sick/care-for-someone.html     [facility]: Interim Guidance for Hospital Discharge to Home: [email or web address]/coronavirus/hcp/hospdischarge.pdf    [facility] clinical trials (COVID-19 research studies): [email or web address]     Below are the COVID-19 hotlines at the [place] Department of [facility] ([facility]). Interpreters are available.   o For health questions: Call 754-244-1784 or 1-373.834.9990 (7 a.m. to 7 p.m.)  o For questions about  schools and childcare: Call 458-095-5190 or 1-798.324.6132 (7 a.m. to 7 p.m.)

## 2021-08-11 ENCOUNTER — OFFICE VISIT (OUTPATIENT)
Dept: FAMILY MEDICINE | Facility: CLINIC | Age: 51
End: 2021-08-11
Payer: COMMERCIAL

## 2021-08-11 ENCOUNTER — HOSPITAL ENCOUNTER (OUTPATIENT)
Dept: GENERAL RADIOLOGY | Facility: CLINIC | Age: 51
Discharge: HOME | End: 2021-08-11
Attending: FAMILY MEDICINE | Admitting: FAMILY MEDICINE
Payer: COMMERCIAL

## 2021-08-11 VITALS
HEIGHT: 69 IN | BODY MASS INDEX: 37.68 KG/M2 | OXYGEN SATURATION: 98 % | TEMPERATURE: 97.4 F | DIASTOLIC BLOOD PRESSURE: 80 MMHG | SYSTOLIC BLOOD PRESSURE: 114 MMHG | WEIGHT: 254.4 LBS | RESPIRATION RATE: 16 BRPM | HEART RATE: 95 BPM

## 2021-08-11 DIAGNOSIS — M79.675 PAIN OF TOE OF LEFT FOOT: ICD-10-CM

## 2021-08-11 DIAGNOSIS — M79.675 PAIN OF TOE OF LEFT FOOT: Primary | ICD-10-CM

## 2021-08-11 PROCEDURE — 99213 OFFICE O/P EST LOW 20 MIN: CPT | Performed by: FAMILY MEDICINE

## 2021-08-11 PROCEDURE — 73660 X-RAY EXAM OF TOE(S): CPT | Mod: LT

## 2021-08-11 ASSESSMENT — MIFFLIN-ST. JEOR: SCORE: 1834.91

## 2021-08-11 NOTE — PROGRESS NOTES
"Toe x ray      Assessment & Plan     Pain of toe of left foot    Proximal phalanx fracture of the left fifth toe will get into see Dr. Block I was able to send him some pictures of the films if it does not heal and is forming a nonunion he will need to take her to the OR and revise the fracture.  - XR Toe Left G/E 2 Views; Future      Christiano Cervantes MD, MD [facility] SONYA Senior is a 51 year old who presents for the following health issues     HPI     Chief Complaint   Patient presents with     Sore toe     stubbed toe on 6/30 - Left baby toe - still feeling uncomfortable. Has not been seen for this concern.     Patient stubbed her toe on 30 June.  She did not get it evaluated at that time she still states it is uncomfortable she is able to ambulate and is able to go to work.  Does take occasional ibuprofen.  Thinks it looks slightly angulated although.      Review of Systems   Constitutional, HEENT, cardiovascular, pulmonary, gi and gu systems are negative, except as otherwise noted.      Objective    /80   Pulse 95   Temp 97.4  F (36.3  C) (Temporal)   Resp 16   Ht 1.755 m (5' 9.1\")   Wt 115.4 kg (254 lb 6.4 oz)   LMP 01/21/2014   SpO2 98%   BMI 37.46 kg/m    Body mass index is 37.46 kg/m .  Physical Exam   GENERAL: healthy, alert and no distress  Left foot:  There is slight swelling at the proximal aspect of the left little toe up.  There might be a slight internal rotation but it is difficult to tell it appears almost similar to her right fifth toe in regards to its positioning.    X-ray reveals a displaced proximal phalanx fracture                "

## 2021-08-31 ENCOUNTER — OFFICE VISIT (OUTPATIENT)
Dept: PODIATRY | Facility: CLINIC | Age: 51
End: 2021-08-31
Payer: COMMERCIAL

## 2021-08-31 VITALS
DIASTOLIC BLOOD PRESSURE: 72 MMHG | BODY MASS INDEX: 37.68 KG/M2 | HEIGHT: 69 IN | SYSTOLIC BLOOD PRESSURE: 124 MMHG | WEIGHT: 254.4 LBS

## 2021-08-31 DIAGNOSIS — S92.512A CLOSED DISPLACED FRACTURE OF PROXIMAL PHALANX OF LESSER TOE OF LEFT FOOT, INITIAL ENCOUNTER: Primary | ICD-10-CM

## 2021-08-31 PROCEDURE — 99203 OFFICE O/P NEW LOW 30 MIN: CPT | Performed by: PODIATRIST

## 2021-08-31 ASSESSMENT — PAIN SCALES - GENERAL: PAINLEVEL: NO PAIN (1)

## 2021-08-31 ASSESSMENT — MIFFLIN-ST. JEOR: SCORE: 1834.91

## 2021-08-31 NOTE — LETTER
8/31/2021         RE: Parrish Bray  [address] [facility] [address]        Dear Colleague,    Thank you for referring your patient, Parrish Bray, to the [facility]. Please see a copy of my visit note below.    HPI:  Parrish Bray is a 51 year old female who is seen in consultation at the request of Christiano Cervantes MD.    Pt presents for eval of:   (Onset, Location, L/R, Character, Treatments, Injury if yes)    XR Left toe 8/11/2021     Onset 6/30/2021, stubbed left 5th toe on bed post. Still having pain. Presents today with sandals. Recalls fracture to this toe approx 16 years while pregnant.  Constant, swelling. Intermittent, dull ache, pain 1-4/10.  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    ROS:  10 point ROS neg other than the symptoms noted above in the HPI.    Patient Active Problem List   Diagnosis     Allergic rhinitis     CARDIOVASCULAR SCREENING; LDL GOAL LESS THAN 160     Menorrhagia     Endometriosis       PAST MEDICAL HISTORY:   Past Medical History:   Diagnosis Date     Allergic rhinitis, cause unspecified      Depressive disorder      NONSPECIFIC MEDICAL HISTORY     Td due in 2019        PAST SURGICAL HISTORY:   Past Surgical History:   Procedure Laterality Date     ABDOMEN SURGERY       CYSTOSCOPY  2/7/2014    Procedure: CYSTOSCOPY;;  Surgeon: Jessy Palomares MD;  Location: [facility] OR     HC TOOTH EXTRACTION W/FORCEP      x5 wisdom teeth.     HYSTERECTOMY TOTAL ABDOMINAL, BILATERAL SALPINGO-OOPHORECTOMY, COMBINED  2/7/2014    Procedure: COMBINED HYSTERECTOMY TOTAL ABDOMINAL, SALPINGO-OOPHORECTOMY;;  Surgeon: Jessy Palomares MD;  Location: [facility] OR     HYSTERECTOMY, PAP NO LONGER INDICATED       LAPAROSCOPY DIAGNOSTIC (GYN)  2/7/2014    Procedure: LAPAROSCOPY DIAGNOSTIC (GYN);  Diagnositc Laparoscopy with Lysis of  Adhesions,  Exploratory Laparotomy, Total Abdominal   Hysterectomy Bilateral  Salpingo-Oopherctomy, Lysis of Adhesions(open), Ureterolysis,Cystoscopy, Protoscopy.  Anesthesia General with Block;  Surgeon: Jessy Palomares MD;  Location: [facility] OR        MEDICATIONS: No current outpatient medications on file.     ALLERGIES:    Allergies   Allergen Reactions     Sulfa Drugs Rash        SOCIAL HISTORY:   Social History     Socioeconomic History     Marital status:      Spouse name: Matias     Number of children: 2     Years of education: 16     Highest education level: Not on file   Occupational History     Occupation:      Employer: [employer]     Comment: [employer]   Tobacco Use     Smoking status: Never Smoker     Smokeless tobacco: Never Used     Tobacco comment: no smoking in the home   Substance and Sexual Activity     Alcohol use: Yes     Comment: Once or twice a month     Drug use: No     Sexual activity: Yes     Partners: Male     Birth control/protection: Female Surgical     Comment: none   Other Topics Concern      Service No     Blood Transfusions No     Caffeine Concern No     Comment: occ     Occupational Exposure No     Hobby Hazards No     Sleep Concern No     Stress Concern No     Weight Concern Yes     Special Diet No     Back Care No     Exercise No     Bike Helmet Not Asked     Comment: na     Seat Belt Yes     Self-Exams No     Parent/sibling w/ CABG, MI or angioplasty before 65F 55M? No   Social History Narrative     Not on file     Social Determinants of Health     Financial Resource Strain:      Difficulty of Paying Living Expenses:    Food Insecurity:      Worried About Running Out of Food in the Last Year:      Ran Out of Food in the Last Year:    Transportation Needs:      Lack of Transportation (Medical):      Lack of Transportation (Non-Medical):    Physical Activity:      Days of Exercise per Week:      Minutes of Exercise per Session:    Stress:      Feeling of Stress :    Social Connections:      Frequency of Communication with Friends  "and Family:      Frequency of Social Gatherings with Friends and Family:      Attends [employer] Services:      Active Member of Clubs or Organizations:      Attends Club or Organization Meetings:      Marital Status:    Intimate Partner Violence:      Fear of Current or Ex-Partner:      Emotionally Abused:      Physically Abused:      Sexually Abused:         FAMILY HISTORY:   Family History   Problem Relation Age of Onset     Cancer Father         prostate,and bladder     Heart Disease Father         stents     Arthritis Father      Coronary Artery Disease Father      Hypertension Father      Prostate Cancer Father      Other Cancer Father         Bladder     Cerebrovascular Disease Father      Hypertension Mother      Cancer Son         cancer of the sinus, rhabdomyoscarcoma     Breast Cancer Maternal Aunt         EXAM:Vitals: /72 (BP Location: Left arm, Patient Position: Sitting, Cuff Size: Adult Large)   Ht 1.755 m (5' 9.1\")   Wt 115.4 kg (254 lb 6.4 oz)   LMP 01/21/2014   BMI 37.46 kg/m    BMI= Body mass index is 37.46 kg/m .    General appearance: Patient is alert and fully cooperative with history & exam.  No sign of distress is noted during the visit.     Psychiatric: Affect is pleasant & appropriate.  Patient appears motivated to improve health.     Respiratory: Breathing is regular & unlabored while sitting.     HEENT: Hearing is intact to spoken word.  Speech is clear.  No gross evidence of visual impairment that would impact ambulation.     Vascular: DP & PT pulses are intact & regular bilaterally.  No significant edema or varicosities noted.  CFT and skin temperature is normal to both lower extremities.     Neurologic: Lower extremity sensation is intact to light touch.  No evidence of weakness or contracture in the lower extremities.  No evidence of neuropathy.    Dermatologic: Skin is intact to both lower extremities with adequate texture, turgor and tone about the integument.  No paronychia " or evidence of soft tissue infection is noted.     Musculoskeletal: Patient is ambulatory without assistive device or brace. Subtle induration and discomfort with firm palpation of the left fifth metatarsal phalangeal joint and proximal phalanx. No limited range of motion through the joint but there is discomfort. Mild flexible deformity or hammering of the digits.    Radiographs: 3 views left foot demonstrate displaced medial condyle of the proximal phalanx extending into the joint.    ASSESSMENT:       ICD-10-CM    1. Closed displaced fracture of proximal phalanx of lesser toe of left foot, initial encounter  S92.512A         PLAN:  Reviewed patient[facility] chart in [facility].      8/31/2021   Recommended stiff sturdy shoes and compression dressing jassi splinting to reduce flexion of the joint and continue activities as tolerated. Follow-up again in about 1 month to repeat imaging and confirm appropriate union versus malunion. We discussed risk of malunion nonunion painful nonunion and traumatic arthritis over time. All questions were answered. Follow-up in about 1 month.      Felice Block DPM            Again, thank you for allowing me to participate in the care of your patient.        Sincerely,        Felice Block DPM

## 2021-08-31 NOTE — PROGRESS NOTES
HPI:  Parrish Bray is a 51 year old female who is seen in consultation at the request of Christiano Cervantes MD.    Pt presents for eval of:   (Onset, Location, L/R, Character, Treatments, Injury if yes)    XR Left toe 8/11/2021     Onset 6/30/2021, stubbed left 5th toe on bed post. Still having pain. Presents today with sandals. Recalls fracture to this toe approx 16 years while pregnant.  Constant, swelling. Intermittent, dull ache, pain 1-4/10.  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    ROS:  10 point ROS neg other than the symptoms noted above in the HPI.    Patient Active Problem List   Diagnosis     Allergic rhinitis     CARDIOVASCULAR SCREENING; LDL GOAL LESS THAN 160     Menorrhagia     Endometriosis       PAST MEDICAL HISTORY:   Past Medical History:   Diagnosis Date     Allergic rhinitis, cause unspecified      Depressive disorder      NONSPECIFIC MEDICAL HISTORY     Td due in 2019        PAST SURGICAL HISTORY:   Past Surgical History:   Procedure Laterality Date     ABDOMEN SURGERY       CYSTOSCOPY  2/7/2014    Procedure: CYSTOSCOPY;;  Surgeon: Jessy Palomares MD;  Location: [facility] OR      TOOTH EXTRACTION W/FORCEP      x5 wisdom teeth.     HYSTERECTOMY TOTAL ABDOMINAL, BILATERAL SALPINGO-OOPHORECTOMY, COMBINED  2/7/2014    Procedure: COMBINED HYSTERECTOMY TOTAL ABDOMINAL, SALPINGO-OOPHORECTOMY;;  Surgeon: Jessy Palomares MD;  Location: [facility] OR     HYSTERECTOMY, PAP NO LONGER INDICATED       LAPAROSCOPY DIAGNOSTIC (GYN)  2/7/2014    Procedure: LAPAROSCOPY DIAGNOSTIC (GYN);  Diagnositc Laparoscopy with Lysis of  Adhesions,  Exploratory Laparotomy, Total Abdominal   Hysterectomy Bilateral Salpingo-Oopherctomy, Lysis of Adhesions(open), Ureterolysis,Cystoscopy, Protoscopy.  Anesthesia General with Block;  Surgeon: Jessy Palomares MD;  Location: [facility] OR        MEDICATIONS: No current outpatient medications on file.     ALLERGIES:    Allergies   Allergen  Reactions     Sulfa Drugs Rash        SOCIAL HISTORY:   Social History     Socioeconomic History     Marital status:      Spouse name: Matias     Number of children: 2     Years of education: 16     Highest education level: Not on file   Occupational History     Occupation:      Employer: [employer]     Comment: [employer]   Tobacco Use     Smoking status: Never Smoker     Smokeless tobacco: Never Used     Tobacco comment: no smoking in the home   Substance and Sexual Activity     Alcohol use: Yes     Comment: Once or twice a month     Drug use: No     Sexual activity: Yes     Partners: Male     Birth control/protection: Female Surgical     Comment: none   Other Topics Concern      Service No     Blood Transfusions No     Caffeine Concern No     Comment: occ     Occupational Exposure No     Hobby Hazards No     Sleep Concern No     Stress Concern No     Weight Concern Yes     Special Diet No     Back Care No     Exercise No     Bike Helmet Not Asked     Comment: na     Seat Belt Yes     Self-Exams No     Parent/sibling w/ CABG, MI or angioplasty before 65F 55M? No   Social History Narrative     Not on file     Social Determinants of Health     Financial Resource Strain:      Difficulty of Paying Living Expenses:    Food Insecurity:      Worried About Running Out of Food in the Last Year:      Ran Out of Food in the Last Year:    Transportation Needs:      Lack of Transportation (Medical):      Lack of Transportation (Non-Medical):    Physical Activity:      Days of Exercise per Week:      Minutes of Exercise per Session:    Stress:      Feeling of Stress :    Social Connections:      Frequency of Communication with Friends and Family:      Frequency of Social Gatherings with Friends and Family:      Attends Temple Services:      Active Member of Clubs or Organizations:      Attends Club or Organization Meetings:      Marital Status:    Intimate Partner Violence:      Fear of Current  "or Ex-Partner:      Emotionally Abused:      Physically Abused:      Sexually Abused:         FAMILY HISTORY:   Family History   Problem Relation Age of Onset     Cancer Father         prostate,and bladder     Heart Disease Father         stents     Arthritis Father      Coronary Artery Disease Father      Hypertension Father      Prostate Cancer Father      Other Cancer Father         Bladder     Cerebrovascular Disease Father      Hypertension Mother      Cancer Son         cancer of the sinus, rhabdomyoscarcoma     Breast Cancer Maternal Aunt         EXAM:Vitals: /72 (BP Location: Left arm, Patient Position: Sitting, Cuff Size: Adult Large)   Ht 1.755 m (5' 9.1\")   Wt 115.4 kg (254 lb 6.4 oz)   LMP 01/21/2014   BMI 37.46 kg/m    BMI= Body mass index is 37.46 kg/m .    General appearance: Patient is alert and fully cooperative with history & exam.  No sign of distress is noted during the visit.     Psychiatric: Affect is pleasant & appropriate.  Patient appears motivated to improve health.     Respiratory: Breathing is regular & unlabored while sitting.     HEENT: Hearing is intact to spoken word.  Speech is clear.  No gross evidence of visual impairment that would impact ambulation.     Vascular: DP & PT pulses are intact & regular bilaterally.  No significant edema or varicosities noted.  CFT and skin temperature is normal to both lower extremities.     Neurologic: Lower extremity sensation is intact to light touch.  No evidence of weakness or contracture in the lower extremities.  No evidence of neuropathy.    Dermatologic: Skin is intact to both lower extremities with adequate texture, turgor and tone about the integument.  No paronychia or evidence of soft tissue infection is noted.     Musculoskeletal: Patient is ambulatory without assistive device or brace. Subtle induration and discomfort with firm palpation of the left fifth metatarsal phalangeal joint and proximal phalanx. No limited range of " motion through the joint but there is discomfort. Mild flexible deformity or hammering of the digits.    Radiographs: 3 views left foot demonstrate displaced medial condyle of the proximal phalanx extending into the joint.    ASSESSMENT:       ICD-10-CM    1. Closed displaced fracture of proximal phalanx of lesser toe of left foot, initial encounter  S92.512A         PLAN:  Reviewed patient's chart in [facility].      8/31/2021   Recommended stiff sturdy shoes and compression dressing jassi splinting to reduce flexion of the joint and continue activities as tolerated. Follow-up again in about 1 month to repeat imaging and confirm appropriate union versus malunion. We discussed risk of malunion nonunion painful nonunion and traumatic arthritis over time. All questions were answered. Follow-up in about 1 month.      Felice Block DPM

## 2021-10-23 ENCOUNTER — HEALTH MAINTENANCE LETTER (OUTPATIENT)
Age: 51
End: 2021-10-23

## 2022-02-12 ENCOUNTER — HEALTH MAINTENANCE LETTER (OUTPATIENT)
Age: 52
End: 2022-02-12

## 2022-06-04 ENCOUNTER — HEALTH MAINTENANCE LETTER (OUTPATIENT)
Age: 52
End: 2022-06-04

## 2022-10-09 ENCOUNTER — HEALTH MAINTENANCE LETTER (OUTPATIENT)
Age: 52
End: 2022-10-09

## 2023-02-18 ENCOUNTER — HEALTH MAINTENANCE LETTER (OUTPATIENT)
Age: 53
End: 2023-02-18

## 2023-06-10 ENCOUNTER — HEALTH MAINTENANCE LETTER (OUTPATIENT)
Age: 53
End: 2023-06-10

## 2024-03-16 ENCOUNTER — HEALTH MAINTENANCE LETTER (OUTPATIENT)
Age: 54
End: 2024-03-16

## 2024-04-12 ENCOUNTER — TELEPHONE (OUTPATIENT)
Dept: FAMILY MEDICINE | Facility: CLINIC | Age: 54
End: 2024-04-12
Payer: COMMERCIAL

## 2024-04-12 NOTE — TELEPHONE ENCOUNTER
Reason for Call:  Appointment Request    Patient requesting this type of appt:    gained 10- 15 pounds in stomach in last 1 month      Requested provider:  PCP or any available provider at  or ER    Reason patient unable to be scheduled: Not within requested timeframe    When does patient want to be seen/preferred time:  Seeking Tues 4/16/24    Comments: Has Tues off    Could we send this information to you in [place] or would you prefer to receive a phone call?:   Patient would prefer a phone call   Okay to leave a detailed message?: Yes at Cell number on file:    Telephone Information:   Mobile [phone]       Call taken on 4/12/2024 at 8:21 AM by Fely Ruiz

## 2024-04-15 ENCOUNTER — ORDERS ONLY (AUTO-RELEASED) (OUTPATIENT)
Dept: FAMILY MEDICINE | Facility: OTHER | Age: 54
End: 2024-04-15

## 2024-04-15 ENCOUNTER — OFFICE VISIT (OUTPATIENT)
Dept: FAMILY MEDICINE | Facility: OTHER | Age: 54
End: 2024-04-15
Payer: COMMERCIAL

## 2024-04-15 VITALS
SYSTOLIC BLOOD PRESSURE: 110 MMHG | DIASTOLIC BLOOD PRESSURE: 76 MMHG | OXYGEN SATURATION: 100 % | BODY MASS INDEX: 38.87 KG/M2 | HEART RATE: 80 BPM | TEMPERATURE: 97.6 F | RESPIRATION RATE: 13 BRPM | WEIGHT: 264 LBS

## 2024-04-15 DIAGNOSIS — Z12.11 COLON CANCER SCREENING: ICD-10-CM

## 2024-04-15 DIAGNOSIS — R63.5 WEIGHT GAIN: Primary | ICD-10-CM

## 2024-04-15 DIAGNOSIS — R10.84 ABDOMINAL PAIN, GENERALIZED: ICD-10-CM

## 2024-04-15 LAB
ALBUMIN SERPL BCG-MCNC: 4.1 G/DL (ref 3.5–5.2)
ALBUMIN UR-MCNC: NEGATIVE MG/DL
ALP SERPL-CCNC: 78 U/L (ref 40–150)
ALT SERPL W P-5'-P-CCNC: 20 U/L (ref 0–50)
ANION GAP SERPL CALCULATED.3IONS-SCNC: 10 MMOL/L (ref 7–15)
APPEARANCE UR: CLEAR
AST SERPL W P-5'-P-CCNC: 22 U/L (ref 0–45)
BACTERIA #/AREA URNS HPF: ABNORMAL /HPF
BILIRUB SERPL-MCNC: 0.3 MG/DL
BILIRUB UR QL STRIP: NEGATIVE
BUN SERPL-MCNC: 14 MG/DL (ref 6–20)
CALCIUM SERPL-MCNC: 9.5 MG/DL (ref 8.6–10)
CHLORIDE SERPL-SCNC: 106 MMOL/L (ref 98–107)
COLOR UR AUTO: YELLOW
CREAT SERPL-MCNC: 1.34 MG/DL (ref 0.51–0.95)
DEPRECATED HCO3 PLAS-SCNC: 25 MMOL/L (ref 22–29)
EGFRCR SERPLBLD CKD-EPI 2021: 47 ML/MIN/1.73M2
ERYTHROCYTE [DISTWIDTH] IN BLOOD BY AUTOMATED COUNT: 12.4 % (ref 10–15)
GLUCOSE SERPL-MCNC: 103 MG/DL (ref 70–99)
GLUCOSE UR STRIP-MCNC: NEGATIVE MG/DL
HBA1C MFR BLD: 5.4 % (ref 0–5.6)
HCT VFR BLD AUTO: 41.4 % (ref 35–47)
HGB BLD-MCNC: 13.8 G/DL (ref 11.7–15.7)
HGB UR QL STRIP: ABNORMAL
KETONES UR STRIP-MCNC: ABNORMAL MG/DL
LEUKOCYTE ESTERASE UR QL STRIP: NEGATIVE
LIPASE SERPL-CCNC: 72 U/L (ref 13–60)
MCH RBC QN AUTO: 30.7 PG (ref 26.5–33)
MCHC RBC AUTO-ENTMCNC: 33.3 G/DL (ref 31.5–36.5)
MCV RBC AUTO: 92 FL (ref 78–100)
NITRATE UR QL: NEGATIVE
PH UR STRIP: 6 [PH] (ref 5–7)
PLATELET # BLD AUTO: 313 10E3/UL (ref 150–450)
POTASSIUM SERPL-SCNC: 5.1 MMOL/L (ref 3.4–5.3)
PROT SERPL-MCNC: 7.6 G/DL (ref 6.4–8.3)
RBC # BLD AUTO: 4.5 10E6/UL (ref 3.8–5.2)
RBC #/AREA URNS AUTO: ABNORMAL /HPF
SODIUM SERPL-SCNC: 141 MMOL/L (ref 135–145)
SP GR UR STRIP: 1.02 (ref 1–1.03)
TSH SERPL DL<=0.005 MIU/L-ACNC: 2.12 UIU/ML (ref 0.3–4.2)
UROBILINOGEN UR STRIP-ACNC: 0.2 E.U./DL
WBC # BLD AUTO: 7.4 10E3/UL (ref 4–11)
WBC #/AREA URNS AUTO: ABNORMAL /HPF

## 2024-04-15 PROCEDURE — 84443 ASSAY THYROID STIM HORMONE: CPT | Performed by: STUDENT IN AN ORGANIZED HEALTH CARE EDUCATION/TRAINING PROGRAM

## 2024-04-15 PROCEDURE — 90471 IMMUNIZATION ADMIN: CPT | Performed by: STUDENT IN AN ORGANIZED HEALTH CARE EDUCATION/TRAINING PROGRAM

## 2024-04-15 PROCEDURE — 81001 URINALYSIS AUTO W/SCOPE: CPT | Performed by: STUDENT IN AN ORGANIZED HEALTH CARE EDUCATION/TRAINING PROGRAM

## 2024-04-15 PROCEDURE — 83690 ASSAY OF LIPASE: CPT | Performed by: STUDENT IN AN ORGANIZED HEALTH CARE EDUCATION/TRAINING PROGRAM

## 2024-04-15 PROCEDURE — 80053 COMPREHEN METABOLIC PANEL: CPT | Performed by: STUDENT IN AN ORGANIZED HEALTH CARE EDUCATION/TRAINING PROGRAM

## 2024-04-15 PROCEDURE — 99213 OFFICE O/P EST LOW 20 MIN: CPT | Mod: 25 | Performed by: STUDENT IN AN ORGANIZED HEALTH CARE EDUCATION/TRAINING PROGRAM

## 2024-04-15 PROCEDURE — 85027 COMPLETE CBC AUTOMATED: CPT | Performed by: STUDENT IN AN ORGANIZED HEALTH CARE EDUCATION/TRAINING PROGRAM

## 2024-04-15 PROCEDURE — 36415 COLL VENOUS BLD VENIPUNCTURE: CPT | Performed by: STUDENT IN AN ORGANIZED HEALTH CARE EDUCATION/TRAINING PROGRAM

## 2024-04-15 PROCEDURE — 83036 HEMOGLOBIN GLYCOSYLATED A1C: CPT | Performed by: STUDENT IN AN ORGANIZED HEALTH CARE EDUCATION/TRAINING PROGRAM

## 2024-04-15 PROCEDURE — 82306 VITAMIN D 25 HYDROXY: CPT | Performed by: STUDENT IN AN ORGANIZED HEALTH CARE EDUCATION/TRAINING PROGRAM

## 2024-04-15 PROCEDURE — 90715 TDAP VACCINE 7 YRS/> IM: CPT | Performed by: STUDENT IN AN ORGANIZED HEALTH CARE EDUCATION/TRAINING PROGRAM

## 2024-04-15 ASSESSMENT — PAIN SCALES - GENERAL: PAINLEVEL: MILD PAIN (2)

## 2024-04-15 NOTE — PROGRESS NOTES
Assessment & Plan     Weight gain  - Comprehensive metabolic panel  - CBC with platelets  - Vitamin D Deficiency  - TSH with free T4 reflex  - Hemoglobin A1c  - UA Macroscopic with reflex to Microscopic and Culture - Lab Collect  - UA Microscopic with Reflex to Culture  Abdominal pain, generalized  - Lipase  Patient with a 1 month history of a weight gain of 10 to 15 pounds and some subtle left sided abdominal pain that waxes and wanes, quite minor over the pain.  No change in bowel or bladder symptoms.  No other associated symptoms to mention of with the weight gain.  She feels like she has not changed her diet by means.  She does have no history of chronic lower extremity edema which this is in question if contributing.  1+ pitting edema bilateral lower extremities, otherwise reassuring exam today.  Labs as aboved.  Worrisome signs and symptoms were discussed    Colon cancer screening  Due for screening  - [facility]([employer]); Future        Subjective   Parrish is a 54 year old, presenting for the following health issues:  Weight Problem        4/15/2024     1:55 PM   Additional Questions   Roomed by Fallon COLMENARES     History of Present Illness       Reason for visit:  Weight gain in belly area with minimal pain  Symptom onset:  More than a month  Symptoms include:  Weight gain  Symptom intensity:  Mild  Symptom progression:  Staying the same  Had these symptoms before:  No  What makes it worse:  No  What makes it better:  No    She eats 2-3 servings of fruits and vegetables daily.She consumes 2 sweetened beverage(s) daily.She exercises with enough effort to increase her heart rate 9 or less minutes per day.  She exercises with enough effort to increase her heart rate 3 or less days per week.   She is taking medications regularly.       Has been noticing some weight gain in midsection over the last month, around 15 or so labs. Has also been having some pain/discomfort in her abdomen off and on averaging around  2-3/10, describes as a shooting pain, pain is more so in lower left quadrant that lower right quadrant but never in upper quadrants.        Review of Systems  Constitutional, HEENT, cardiovascular, pulmonary, gi and gu systems are negative, except as otherwise noted.      Prior to immunization administration, verified patients identity using patient s name and date of birth. Please see Immunization Activity for additional information.     Screening Questionnaire for Adult Immunization    Are you sick today?   No   Do you have allergies to medications, food, a vaccine component or latex?   No   Have you ever had a serious reaction after receiving a vaccination?   No   Do you have a long-term health problem with heart, lung, kidney, or metabolic disease (e.g., diabetes), asthma, a blood disorder, no spleen, complement component deficiency, a cochlear implant, or a spinal fluid leak?  Are you on long-term aspirin therapy?   No   Do you have cancer, leukemia, HIV/AIDS, or any other immune system problem?   No   Do you have a parent, brother, or sister with an immune system problem?   No   In the past 3 months, have you taken medications that affect  your immune system, such as prednisone, other steroids, or anticancer drugs; drugs for the treatment of rheumatoid arthritis, Crohn s disease, or psoriasis; or have you had radiation treatments?   No   Have you had a seizure, or a brain or other nervous system problem?   No   During the past year, have you received a transfusion of blood or blood    products, or been given immune (gamma) globulin or antiviral drug?   No   For women: Are you pregnant or is there a chance you could become       pregnant during the next month?   No   Have you received any vaccinations in the past 4 weeks?   No     Immunization questionnaire answers were all negative.      Patient instructed to remain in clinic for 15 minutes afterwards, and to report any adverse reactions.     Screening  performed by Karina Campbell MA on 4/15/2024 at 2:34 PM.       Objective    /76 (Cuff Size: Adult Large)   Pulse 80   Temp 97.6  F (36.4  C) (Oral)   Resp 13   Wt 119.7 kg (264 lb)   LMP 01/21/2014   SpO2 100%   BMI 38.87 kg/m    Body mass index is 38.87 kg/m .  Physical Exam  Vitals and nursing note reviewed.   Constitutional:       General: She is not in acute distress.     Appearance: Normal appearance. She is not ill-appearing, toxic-appearing or diaphoretic.   HENT:      Head: Normocephalic and atraumatic.      Right Ear: Tympanic membrane, ear canal and external ear normal. There is no impacted cerumen.      Left Ear: Tympanic membrane, ear canal and external ear normal. There is no impacted cerumen.      Nose: Nose normal. No congestion or rhinorrhea.      Mouth/Throat:      Mouth: Mucous membranes are moist.      Pharynx: Oropharynx is clear. No oropharyngeal exudate or posterior oropharyngeal erythema.   Eyes:      General: No scleral icterus.        Right eye: No discharge.         Left eye: No discharge.      Extraocular Movements: Extraocular movements intact.      Conjunctiva/sclera: Conjunctivae normal.      Pupils: Pupils are equal, round, and reactive to light.   Cardiovascular:      Rate and Rhythm: Normal rate and regular rhythm.      Heart sounds: No murmur heard.  Pulmonary:      Effort: Pulmonary effort is normal. No respiratory distress.      Breath sounds: Normal breath sounds. No stridor.   Abdominal:      General: There is no distension.      Palpations: Abdomen is soft.      Tenderness: There is no abdominal tenderness.      Hernia: No hernia is present.   Musculoskeletal:         General: Normal range of motion.      Cervical back: Normal range of motion.      Right lower leg: Edema present.      Left lower leg: Edema present.   Lymphadenopathy:      Cervical: No cervical adenopathy.   Skin:     General: Skin is warm.   Neurological:      Mental Status: She is alert.    Psychiatric:         Mood and Affect: Mood normal.         Behavior: Behavior normal.         Thought Content: Thought content normal.         Judgment: Judgment normal.            Signed Electronically by: MADELEINE PRIDE MD

## 2024-04-16 LAB — VIT D+METAB SERPL-MCNC: 21 NG/ML (ref 20–50)

## 2024-04-17 NOTE — RESULT ENCOUNTER NOTE
Parrish,    Here are your most recent lab results    Kidneys look a little irritated possibly related to dehydration, I would encourage you to increase your water intake and avoid any ibuprofen type medications including Aleve and Advil..     Lipase is slightly elevated, for where your pain was on your abdomen I would not to be so concerned about the pancreas causing this.  I would avoid any alcohol in the meantime to be sure and hydrate more frequently.    Vitamin D levels are low normal, you could consider daily supplementation over-the-counter.     All other labs within normal range or stable    If you have any questions feel free to call the clinic at [phone].      Thank you,    Bharat Williamson MD

## 2024-04-29 ENCOUNTER — TELEPHONE (OUTPATIENT)
Dept: FAMILY MEDICINE | Facility: OTHER | Age: 54
End: 2024-04-29
Payer: COMMERCIAL

## 2024-04-29 NOTE — TELEPHONE ENCOUNTER
Didn't see any notes in last OV that said when you wanted to see patient again. Are you ok with working her in this week for a follow up from 4/15?

## 2024-04-29 NOTE — TELEPHONE ENCOUNTER
Reason for Call:  Appointment Request    Patient requesting this type of appt:  Office visit    Requested provider:  Bharat Williamson    Reason patient unable to be scheduled: Not within requested timeframe    When does patient want to be seen/preferred time: 1-2 days    Comments: patient called and is requesting to get worked in on Tuesday for a F/U from her last visit per pt    Could we send this information to you in [employer] or would you prefer to receive a phone call?:   Patient would prefer a phone call   Okay to leave a detailed message?: Yes at Home number on file [phone] (home)    Call taken on 4/29/2024 at 11:52 AM by Jodee Luevano

## 2024-04-29 NOTE — TELEPHONE ENCOUNTER
Feel free to use any open same-day or approval slot this week or next week      Thank you,    Bharat Williamson MD    [address] [address]   Ph: [phone]  Fax:[phone]

## 2024-04-30 ENCOUNTER — OFFICE VISIT (OUTPATIENT)
Dept: FAMILY MEDICINE | Facility: OTHER | Age: 54
End: 2024-04-30
Payer: COMMERCIAL

## 2024-04-30 VITALS
BODY MASS INDEX: 39.25 KG/M2 | WEIGHT: 265 LBS | TEMPERATURE: 97.9 F | HEIGHT: 69 IN | HEART RATE: 80 BPM | DIASTOLIC BLOOD PRESSURE: 86 MMHG | RESPIRATION RATE: 16 BRPM | SYSTOLIC BLOOD PRESSURE: 130 MMHG | OXYGEN SATURATION: 95 %

## 2024-04-30 DIAGNOSIS — R10.84 ABDOMINAL PAIN, GENERALIZED: Primary | ICD-10-CM

## 2024-04-30 DIAGNOSIS — R60.0 BILATERAL LEG EDEMA: ICD-10-CM

## 2024-04-30 DIAGNOSIS — R31.9 HEMATURIA, UNSPECIFIED TYPE: ICD-10-CM

## 2024-04-30 LAB
ANION GAP SERPL CALCULATED.3IONS-SCNC: 9 MMOL/L (ref 7–15)
BUN SERPL-MCNC: 18.4 MG/DL (ref 6–20)
CALCIUM SERPL-MCNC: 9.7 MG/DL (ref 8.6–10)
CHLORIDE SERPL-SCNC: 106 MMOL/L (ref 98–107)
CREAT SERPL-MCNC: 0.96 MG/DL (ref 0.51–0.95)
DEPRECATED HCO3 PLAS-SCNC: 24 MMOL/L (ref 22–29)
EGFRCR SERPLBLD CKD-EPI 2021: 70 ML/MIN/1.73M2
GLUCOSE SERPL-MCNC: 100 MG/DL (ref 70–99)
LIPASE SERPL-CCNC: 66 U/L (ref 13–60)
NT-PROBNP SERPL-MCNC: <36 PG/ML (ref 0–900)
POTASSIUM SERPL-SCNC: 4.3 MMOL/L (ref 3.4–5.3)
SODIUM SERPL-SCNC: 139 MMOL/L (ref 135–145)

## 2024-04-30 PROCEDURE — 83690 ASSAY OF LIPASE: CPT | Performed by: STUDENT IN AN ORGANIZED HEALTH CARE EDUCATION/TRAINING PROGRAM

## 2024-04-30 PROCEDURE — 36415 COLL VENOUS BLD VENIPUNCTURE: CPT | Performed by: STUDENT IN AN ORGANIZED HEALTH CARE EDUCATION/TRAINING PROGRAM

## 2024-04-30 PROCEDURE — 83880 ASSAY OF NATRIURETIC PEPTIDE: CPT | Performed by: STUDENT IN AN ORGANIZED HEALTH CARE EDUCATION/TRAINING PROGRAM

## 2024-04-30 PROCEDURE — 99214 OFFICE O/P EST MOD 30 MIN: CPT | Performed by: STUDENT IN AN ORGANIZED HEALTH CARE EDUCATION/TRAINING PROGRAM

## 2024-04-30 PROCEDURE — 80048 BASIC METABOLIC PNL TOTAL CA: CPT | Performed by: STUDENT IN AN ORGANIZED HEALTH CARE EDUCATION/TRAINING PROGRAM

## 2024-04-30 ASSESSMENT — PAIN SCALES - GENERAL: PAINLEVEL: MILD PAIN (3)

## 2024-04-30 NOTE — PROGRESS NOTES
Assessment & Plan     Abdominal pain, generalized  - CT Abdomen Pelvis w/o Contrast; Future  - Basic metabolic panel  (Ca, Cl, CO2, Creat, Gluc, K, Na, BUN)  - Lipase  Bilateral leg edema  - Basic metabolic panel  (Ca, Cl, CO2, Creat, Gluc, K, Na, BUN)  - BNP-N terminal pro  Hematuria, unspecified type  - US Renal Complete Non-Vascular; Future  Patient continues to have lower abdominal pain and if anything has worsened since last appointment.  Did go over labs today, CKD that is new, slightly elevated lipase, hematuria noted.  Patient does have history of hysterectomy, UA otherwise negative.  Will also obtain ultrasound of kidneys and CT scan of abdomen and pelvis.  Also of note, she feels like her swelling in her lower abdomen/legs have also slightly worsened, 1 pound changes since last month, trace to 1+ edema bilateral lower legs.  Will also add on proBNP today.  Worrisome signs and symptoms discussed.  May consider future urology referral versus cardiology referral pending results      Nohelia Senior is a 54 year old, presenting for the following health issues:  Abdominal Pain        4/30/2024    10:44 AM   Additional Questions   Roomed by twan   Accompanied by alone         4/30/2024    10:44 AM   Patient Reported Additional Medications   Patient reports taking the following new medications none     History of Present Illness       Reason for visit:  Still having abdoninal pain  Symptom onset:  More than a month  Symptoms include:  More pain and fluid retention in abdomen  Symptom intensity:  Moderate  Symptom progression:  Worsening  Had these symptoms before:  No  What makes it worse:  Moving  What makes it better:  Not moving    She eats 2-3 servings of fruits and vegetables daily.She consumes 2 sweetened beverage(s) daily.She exercises with enough effort to increase her heart rate 9 or less minutes per day.  She exercises with enough effort to increase her heart rate 3 or less days per week.   She  "is taking medications regularly.           Review of Systems  Constitutional, HEENT, cardiovascular, pulmonary, gi and gu systems are negative, except as otherwise noted.      Objective    /86   Pulse 80   Temp 97.9  F (36.6  C) (Temporal)   Resp 16   Ht 1.753 m (5' 9\")   Wt 120.2 kg (265 lb)   LMP 01/21/2014   SpO2 95%   BMI 39.13 kg/m    Body mass index is 39.13 kg/m .  Physical Exam  Vitals and nursing note reviewed.   Constitutional:       General: She is not in acute distress.     Appearance: Normal appearance. She is not ill-appearing, toxic-appearing or diaphoretic.   HENT:      Head: Normocephalic and atraumatic.      Right Ear: Tympanic membrane, ear canal and external ear normal. There is no impacted cerumen.      Left Ear: Tympanic membrane, ear canal and external ear normal. There is no impacted cerumen.      Nose: Nose normal. No congestion or rhinorrhea.      Mouth/Throat:      Mouth: Mucous membranes are moist.      Pharynx: Oropharynx is clear. No oropharyngeal exudate or posterior oropharyngeal erythema.   Eyes:      General: No scleral icterus.        Right eye: No discharge.         Left eye: No discharge.      Extraocular Movements: Extraocular movements intact.      Conjunctiva/sclera: Conjunctivae normal.      Pupils: Pupils are equal, round, and reactive to light.   Cardiovascular:      Rate and Rhythm: Normal rate and regular rhythm.      Heart sounds: No murmur heard.  Pulmonary:      Effort: Pulmonary effort is normal. No respiratory distress.      Breath sounds: Normal breath sounds. No stridor.   Abdominal:      General: There is no distension.      Palpations: Abdomen is soft.      Tenderness: There is abdominal tenderness (slightly generalized, more so lower abdomen).      Hernia: No hernia is present.   Musculoskeletal:         General: Normal range of motion.      Cervical back: Normal range of motion.      Right lower leg: No edema.      Left lower leg: No edema. "   Lymphadenopathy:      Cervical: No cervical adenopathy.   Skin:     General: Skin is warm.   Neurological:      Mental Status: She is alert.   Psychiatric:         Mood and Affect: Mood normal.         Behavior: Behavior normal.         Thought Content: Thought content normal.         Judgment: Judgment normal.                Signed Electronically by: MADELEINE PRIDE MD

## 2024-05-01 LAB — NONINV COLON CA DNA+OCC BLD SCRN STL QL: NEGATIVE

## 2024-05-01 NOTE — RESULT ENCOUNTER NOTE
Parrish,    Here are your recent blood results    Kidney function has improved and back to your normal    Pancreas enzyme has improved and nearly normal    The heart protein marker is normal, I have no concerns about heart failure    We will follow up after you get your CT and ultrasound       If you have any questions feel free to call the clinic at [phone].      Thank you,    Bharat Williamson MD

## 2024-05-14 ENCOUNTER — HOSPITAL ENCOUNTER (OUTPATIENT)
Dept: CT IMAGING | Facility: CLINIC | Age: 54
Discharge: HOME | End: 2024-05-14
Attending: STUDENT IN AN ORGANIZED HEALTH CARE EDUCATION/TRAINING PROGRAM
Payer: COMMERCIAL

## 2024-05-14 ENCOUNTER — HOSPITAL ENCOUNTER (OUTPATIENT)
Dept: ULTRASOUND IMAGING | Facility: CLINIC | Age: 54
Discharge: HOME | End: 2024-05-14
Attending: STUDENT IN AN ORGANIZED HEALTH CARE EDUCATION/TRAINING PROGRAM
Payer: COMMERCIAL

## 2024-05-14 DIAGNOSIS — R31.9 HEMATURIA, UNSPECIFIED TYPE: ICD-10-CM

## 2024-05-14 DIAGNOSIS — R10.84 ABDOMINAL PAIN, GENERALIZED: ICD-10-CM

## 2024-05-14 PROCEDURE — 76770 US EXAM ABDO BACK WALL COMP: CPT

## 2024-05-14 PROCEDURE — 74176 CT ABD & PELVIS W/O CONTRAST: CPT

## 2024-05-16 DIAGNOSIS — N13.30 HYDRONEPHROSIS, UNSPECIFIED HYDRONEPHROSIS TYPE: ICD-10-CM

## 2024-05-16 DIAGNOSIS — N17.9 AKI (ACUTE KIDNEY INJURY) (H): Primary | ICD-10-CM

## 2024-05-16 NOTE — RESULT ENCOUNTER NOTE
Parrish,    Here are your recent results    There are some non concerning cysts on the kidney. Kidney stones are also seen and the kidney is slightly enlarged/swollen (possibly due to the stone). With some of the recent labs that we discussed about your kidneys that were abnormal, I did place a referral to the kidney specialists to have them discuss with you further to see if these are the culprits of your pain. They should be reaching out to schedule an appointment       If you have any questions feel free to call the clinic at [phone].      Thank you,    Bharat Williamson MD

## 2024-06-13 NOTE — CONFIDENTIAL NOTE
DIAGNOSIS:    SHEREE (acute kidney injury) (H24)   Hydronephrosis, unspecified hydronephrosis type      DATE RECEIVED: 08.16.2024    NOTES STATUS DETAILS   OFFICE NOTE from referring provider Internal 05.16.2024 Bharat Williamson MD    IMAGING  (NEED IMAGES AND REPORTS)     KIDNEY CT SCAN Internal 05.14.2024 CT Abd Pelvis   KIDNEY ULTRASOUND Internal 05.14.2024 US Renal Complete   LABS     CBC Internal 04.15.2024   CMP Internal 04.15.2024   BMP Internal 04.30.2024   UA Internal 04.15.2024    URINE PROTEIN Internal 04.15.2024

## 2024-08-06 DIAGNOSIS — N17.9 AKI (ACUTE KIDNEY INJURY) (H): Primary | ICD-10-CM

## 2024-08-13 ENCOUNTER — LAB (OUTPATIENT)
Dept: LAB | Facility: CLINIC | Age: 54
End: 2024-08-13
Attending: STUDENT IN AN ORGANIZED HEALTH CARE EDUCATION/TRAINING PROGRAM
Payer: COMMERCIAL

## 2024-08-13 DIAGNOSIS — N17.9 AKI (ACUTE KIDNEY INJURY) (H): ICD-10-CM

## 2024-08-13 LAB
ALBUMIN MFR UR ELPH: <6 MG/DL
ALBUMIN SERPL BCG-MCNC: 3.9 G/DL (ref 3.5–5.2)
ALBUMIN UR-MCNC: NEGATIVE MG/DL
ANION GAP SERPL CALCULATED.3IONS-SCNC: 10 MMOL/L (ref 7–15)
APPEARANCE UR: CLEAR
BACTERIA #/AREA URNS HPF: ABNORMAL /HPF
BILIRUB UR QL STRIP: NEGATIVE
BUN SERPL-MCNC: 12.6 MG/DL (ref 6–20)
CALCIUM SERPL-MCNC: 9.2 MG/DL (ref 8.8–10.4)
CHLORIDE SERPL-SCNC: 107 MMOL/L (ref 98–107)
COLOR UR AUTO: YELLOW
CREAT SERPL-MCNC: 0.96 MG/DL (ref 0.51–0.95)
CREAT UR-MCNC: 55.4 MG/DL
CREAT UR-MCNC: 55.4 MG/DL
EGFRCR SERPLBLD CKD-EPI 2021: 70 ML/MIN/1.73M2
ERYTHROCYTE [DISTWIDTH] IN BLOOD BY AUTOMATED COUNT: 13 % (ref 10–15)
GLUCOSE SERPL-MCNC: 133 MG/DL (ref 70–99)
GLUCOSE UR STRIP-MCNC: NEGATIVE MG/DL
HCO3 SERPL-SCNC: 23 MMOL/L (ref 22–29)
HCT VFR BLD AUTO: 40.7 % (ref 35–47)
HGB BLD-MCNC: 13.6 G/DL (ref 11.7–15.7)
HGB UR QL STRIP: ABNORMAL
KETONES UR STRIP-MCNC: NEGATIVE MG/DL
LEUKOCYTE ESTERASE UR QL STRIP: NEGATIVE
MCH RBC QN AUTO: 30.4 PG (ref 26.5–33)
MCHC RBC AUTO-ENTMCNC: 33.4 G/DL (ref 31.5–36.5)
MCV RBC AUTO: 91 FL (ref 78–100)
MICROALBUMIN UR-MCNC: <12 MG/L
MICROALBUMIN/CREAT UR: NORMAL MG/G{CREAT}
NITRATE UR QL: NEGATIVE
PH UR STRIP: 5 [PH] (ref 5–7)
PHOSPHATE SERPL-MCNC: 3.1 MG/DL (ref 2.5–4.5)
PLATELET # BLD AUTO: 277 10E3/UL (ref 150–450)
POTASSIUM SERPL-SCNC: 3.6 MMOL/L (ref 3.4–5.3)
PROT/CREAT 24H UR: NORMAL MG/G{CREAT}
RBC # BLD AUTO: 4.47 10E6/UL (ref 3.8–5.2)
RBC URINE: 1 /HPF
SODIUM SERPL-SCNC: 140 MMOL/L (ref 135–145)
SP GR UR STRIP: 1 (ref 1–1.03)
SQUAMOUS EPITHELIAL: 1 /HPF
UROBILINOGEN UR STRIP-MCNC: NORMAL MG/DL
WBC # BLD AUTO: 7.4 10E3/UL (ref 4–11)
WBC URINE: 0 /HPF

## 2024-08-13 PROCEDURE — 82570 ASSAY OF URINE CREATININE: CPT

## 2024-08-13 PROCEDURE — 81001 URINALYSIS AUTO W/SCOPE: CPT

## 2024-08-13 PROCEDURE — 36415 COLL VENOUS BLD VENIPUNCTURE: CPT

## 2024-08-13 PROCEDURE — 82043 UR ALBUMIN QUANTITATIVE: CPT

## 2024-08-13 PROCEDURE — 85027 COMPLETE CBC AUTOMATED: CPT

## 2024-08-13 PROCEDURE — 80069 RENAL FUNCTION PANEL: CPT

## 2024-08-13 PROCEDURE — 84156 ASSAY OF PROTEIN URINE: CPT

## 2024-08-16 ENCOUNTER — VIRTUAL VISIT (OUTPATIENT)
Dept: NEPHROLOGY | Facility: CLINIC | Age: 54
End: 2024-08-16
Attending: STUDENT IN AN ORGANIZED HEALTH CARE EDUCATION/TRAINING PROGRAM
Payer: COMMERCIAL

## 2024-08-16 ENCOUNTER — PRE VISIT (OUTPATIENT)
Dept: NEPHROLOGY | Facility: CLINIC | Age: 54
End: 2024-08-16
Payer: COMMERCIAL

## 2024-08-16 DIAGNOSIS — R60.0 LOCALIZED EDEMA: ICD-10-CM

## 2024-08-16 DIAGNOSIS — N13.30 HYDRONEPHROSIS, UNSPECIFIED HYDRONEPHROSIS TYPE: ICD-10-CM

## 2024-08-16 DIAGNOSIS — N20.0 KIDNEY STONES, CALCIUM OXALATE: ICD-10-CM

## 2024-08-16 DIAGNOSIS — R10.9 ABDOMINAL PAIN, UNSPECIFIED ABDOMINAL LOCATION: Primary | ICD-10-CM

## 2024-08-16 DIAGNOSIS — N17.9 AKI (ACUTE KIDNEY INJURY) (H): ICD-10-CM

## 2024-08-16 DIAGNOSIS — N20.0 KIDNEY STONE: ICD-10-CM

## 2024-08-16 PROCEDURE — 99442 PR PHYSICIAN TELEPHONE EVALUATION 11-20 MIN: CPT | Mod: 95 | Performed by: STUDENT IN AN ORGANIZED HEALTH CARE EDUCATION/TRAINING PROGRAM

## 2024-08-16 NOTE — PROGRESS NOTES
"Nephrology Clinic    Phone Visit    CC: elevated creatinine    HPI:  Parrish Bray is a 54 year old female with prior calcium oxalate kidney stone and not on any Rx meds currently who was found to have an elevated creatinine and hydronephrosis and was referred to nephrology by Dr. Bharat Williamson for this.     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 She denies back pain or NSAID use, instead using APAP. No N/V. She has occasionally also had leg swelling. She states she requested a diuretic, and before beginning one her creatinine was found to be 1.34, up from 1.0 three years prior. CT scan and renal U/S done showed potential left side hydronephrosis. Also a non-obstructing kidney stone was seen on the left as well. She also had a kidney cyst without description of malignant features.     It sounds like the patient isn't great at drinking as much water as she should as a former kidney stone former, though she reports \"trying to drink more fluid\" after her decreased kidney function was found. Over the years, her creatinine has fluctuated between 0.7 and 1.2. She notes that she does not want to urinate during work as a .     Her UAs over several years have repeatedly have blood. No proteinuria or albuminuria on labs done 3 days ago.       Past Medical History:   Diagnosis Date    Allergic rhinitis, cause unspecified     Depressive disorder     NONSPECIFIC MEDICAL HISTORY     Td due in 2019   Kidney stone: 1/13/2016 stone analysis:  Calculi composed primarily of:   90% calcium oxalate monohydrate, and   10% calcium oxalate dihydrate.     Past Surgical History:   Procedure Laterality Date    ABDOMEN SURGERY      CYSTOSCOPY  2/7/2014    Procedure: CYSTOSCOPY;;  Surgeon: Jessy Palomares MD;  Location:  OR     TOOTH " EXTRACTION W/FORCEP      x5 wisdom teeth.    HYSTERECTOMY TOTAL ABDOMINAL, BILATERAL SALPINGO-OOPHORECTOMY, COMBINED  2/7/2014    Procedure: COMBINED HYSTERECTOMY TOTAL ABDOMINAL, SALPINGO-OOPHORECTOMY;;  Surgeon: Jessy Palomares MD;  Location: [facility] OR    HYSTERECTOMY, PAP NO LONGER INDICATED      LAPAROSCOPY DIAGNOSTIC (GYN)  2/7/2014    Procedure: LAPAROSCOPY DIAGNOSTIC (GYN);  Diagnositc Laparoscopy with Lysis of  Adhesions,  Exploratory Laparotomy, Total Abdominal   Hysterectomy Bilateral Salpingo-Oopherctomy, Lysis of Adhesions(open), Ureterolysis,Cystoscopy, Protoscopy.  Anesthesia General with Block;  Surgeon: Jessy Palomares MD;  Location: [facility] OR        Family History   Problem Relation Age of Onset    Cancer Father         prostate,and bladder    Heart Disease Father         stents    Arthritis Father     Coronary Artery Disease Father     Hypertension Father     Prostate Cancer Father     Other Cancer Father         Bladder    Cerebrovascular Disease Father     Hypertension Mother     Cancer Son         cancer of the sinus, rhabdomyoscarcoma    Breast Cancer Maternal Aunt        Social History     Tobacco Use    Smoking status: Never     Passive exposure: Never    Smokeless tobacco: Never    Tobacco comments:     no smoking in the home   Vaping Use    Vaping status: Never Used   Substance Use Topics    Alcohol use: Yes     Comment: Once or twice a month    Drug use: No     Medications:  No current outpatient medications on file.       Review Of Systems:  10 point ROS otherwise negative     OBJECTIVE:  Vitals and physical exam deferred due to phone visit.   Last vitals in our system 4/15/24: /76, P80; and 4/30/24: /86, P80      Renal panel, CBC, UA, UPCR, and UACR reviewed.   Recent Labs   Lab Test 08/13/24  0928 04/30/24  1124    139   POTASSIUM 3.6 4.3   CHLORIDE 107 106   CO2 23 24   ANIONGAP 10 9   * 100*   BUN 12.6 18.4   CR 0.96* 0.96*   ALVARO 9.2 9.7       Lab Results  "  Component Value Date    WBC 7.4 08/13/2024    WBC 5.2 01/29/2021     Lab Results   Component Value Date    RBC 4.47 08/13/2024    RBC 4.44 01/29/2021     Lab Results   Component Value Date    HGB 13.6 08/13/2024    HGB 13.7 01/29/2021     Lab Results   Component Value Date    HCT 40.7 08/13/2024    HCT 40.6 01/29/2021     No components found for: \"MCT\"  Lab Results   Component Value Date    MCV 91 08/13/2024    MCV 91 01/29/2021     Lab Results   Component Value Date    MCH 30.4 08/13/2024    MCH 30.9 01/29/2021     Lab Results   Component Value Date    MCHC 33.4 08/13/2024    MCHC 33.7 01/29/2021     Lab Results   Component Value Date    RDW 13.0 08/13/2024    RDW 12.4 01/29/2021     Lab Results   Component Value Date     08/13/2024     01/29/2021       Color Urine (no units)   Date Value   08/13/2024 Yellow   01/29/2021 Yellow     Appearance Urine (no units)   Date Value   08/13/2024 Clear   01/29/2021 Clear     Glucose Urine (mg/dL)   Date Value   08/13/2024 Negative   01/29/2021 Negative     Bilirubin Urine (no units)   Date Value   08/13/2024 Negative   01/29/2021 Negative     Ketones Urine (mg/dL)   Date Value   08/13/2024 Negative   01/29/2021 Negative     Specific Gravity Urine (no units)   Date Value   08/13/2024 1.005   01/29/2021 1.011     pH Urine   Date Value   08/13/2024 5.0   01/29/2021 5.0 pH     Protein Albumin Urine (mg/dL)   Date Value   08/13/2024 Negative   01/29/2021 Negative     Urobilinogen Urine   Date Value   04/15/2024 0.2 E.U./dL   01/12/2016 0.2 EU/dL     Nitrite Urine (no units)   Date Value   08/13/2024 Negative   01/29/2021 Negative     Leukocyte Esterase Urine (no units)   Date Value   08/13/2024 Negative   01/29/2021 Negative             ASSESSMENT/PLAN:  Pt is a 54 year old female here to establish care.    Parrish was seen today for consult.    Diagnoses and all orders for this visit:    SHEREE (acute kidney injury) (H24)  Recovered. Unclear if pre-renal cause or " related to unilateral hydronephrosis (less likely but possible; CT scan from 5/2024 suggests it could be a chronic cause). Discussed water intake, both because she may start a diuretic with her PCP and for SHEREE prevention and for kidney stone hx.   -     Adult Nephrology  Referral    Hydronephrosis, unspecified hydronephrosis type  A diagnosis of hydronephrosis on May 2024 imaging should have resulted in a urology referral (this is treated by urology, not nephrology). It is unclear if this is the cause of her SHEREE, or if this is incidental (it is unilateral, which is less likely to cause SHEREE, and the CT scan done same day as the ultrasound suggests a chronic cause for hydronephrosis).   -     Adult Nephrology  Referral  -     US Renal Complete Non-Vascular; Future    Calcium oxalate kidney stones  Edema, localized   Discussed increasing water intake for stones. Her PCP can begin hydrochlorothiazide for edema and for stone prophylaxis (thiazides decrease calcium oxalate stone risk).   Unclear if edema relates to SHEREE - it may, but her SHEREE has now recovered and she reports the edema has improved. Recommend PCP get once to twice annual BMP check on this patient. She can return to nephrology clinic if her creatinine rises again (or other nephrology concerns).     Abdominal Pain  Not renal in origin based on symptoms.     Hematuria  Seen on several UAs over several years. Never smoker. Requires urology workup. No suspicion for GN given no proteinuria (and now creatinine has improved/recovered back to <1.0).       Return to clinic as needed. No planned follow-up at this time. Plan to refer to urology, the urgency of which pending a repeat renal U/S (hydro and hematuria referral to urology). Thus, waiting for repeat U/S results before placing urology referral order. Patient in agreement with this plan to determine urgency of referral.   Recommend PCP get once to twice annual BMP check on this patient. She  can return to nephrology clinic if her creatinine rises again (or other nephrology concerns).     Edmond Saravia MD  Nephrology  Pager [phone]        Virtual Visit Details    Type of service:  Phone Visit     Originating Location (pt. Location): Other at her place of work    Distant Location (provider location):  On-site  Platform used for Virtual Visit: Other: phone call, lasting 18 minutes

## 2024-08-16 NOTE — NURSING NOTE
Current patient location: [address] [place] [id number]    Is the patient currently in the state of [place]? YES    Visit mode:VIDEO    If the visit is dropped, the patient can be reconnected by: VIDEO VISIT: Text to cell phone:   Telephone Information:   Mobile [phone]       Will anyone else be joining the visit? NO  (If patient encounters technical issues they should call [phone]    How would you like to obtain your AVS? MyChart    Are changes needed to the allergy or medication list? No    Are refills needed on medications prescribed by this physician? NO    Rooming Documentation:  Questionnaire(s) completed      Reason for visit: Consult    Fran CLAYTON

## 2024-08-16 NOTE — LETTER
"8/16/2024       RE: Parrish Bray  [address] [facility] [address]     Dear Colleague,    Thank you for referring your patient, Parrish Bray, to the [facility] [place] at [facility]. Please see a copy of my visit note below.    Nephrology Clinic    Phone Visit    CC: elevated creatinine    HPI:  Parrish Bray is a 54 year old female with prior calcium oxalate kidney stone and not on any Rx meds currently who was found to have an elevated creatinine and hydronephrosis and was referred to nephrology by Dr. Bharat Williamson for this.     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 She denies back pain or NSAID use, instead using APAP. No N/V. She has occasionally also had leg swelling. She states she requested a diuretic, and before beginning one her creatinine was found to be 1.34, up from 1.0 three years prior. CT scan and renal U/S done showed potential left side hydronephrosis. Also a non-obstructing kidney stone was seen on the left as well. She also had a kidney cyst without description of malignant features.     It sounds like the patient isn't great at drinking as much water as she should as a former kidney stone former, though she reports \"trying to drink more fluid\" after her decreased kidney function was found. Over the years, her creatinine has fluctuated between 0.7 and 1.2. She notes that she does not want to urinate during work as a .     Her UAs over several years have repeatedly have blood. No proteinuria or albuminuria on labs done 3 days ago.       Past Medical History:   Diagnosis Date     Allergic rhinitis, cause unspecified      Depressive disorder      NONSPECIFIC MEDICAL HISTORY     Td due in 2019   Kidney stone: " 1/13/2016 stone analysis:  Calculi composed primarily of:   90% calcium oxalate monohydrate, and   10% calcium oxalate dihydrate.     Past Surgical History:   Procedure Laterality Date     ABDOMEN SURGERY       CYSTOSCOPY  2/7/2014    Procedure: CYSTOSCOPY;;  Surgeon: Jessy Palomares MD;  Location: [facility] OR     HC TOOTH EXTRACTION W/FORCEP      x5 wisdom teeth.     HYSTERECTOMY TOTAL ABDOMINAL, BILATERAL SALPINGO-OOPHORECTOMY, COMBINED  2/7/2014    Procedure: COMBINED HYSTERECTOMY TOTAL ABDOMINAL, SALPINGO-OOPHORECTOMY;;  Surgeon: Jessy Palomares MD;  Location: [facility] OR     HYSTERECTOMY, PAP NO LONGER INDICATED       LAPAROSCOPY DIAGNOSTIC (GYN)  2/7/2014    Procedure: LAPAROSCOPY DIAGNOSTIC (GYN);  Diagnositc Laparoscopy with Lysis of  Adhesions,  Exploratory Laparotomy, Total Abdominal   Hysterectomy Bilateral Salpingo-Oopherctomy, Lysis of Adhesions(open), Ureterolysis,Cystoscopy, Protoscopy.  Anesthesia General with Block;  Surgeon: Jessy Palomares MD;  Location: [facility] OR        Family History   Problem Relation Age of Onset     Cancer Father         prostate,and bladder     Heart Disease Father         stents     Arthritis Father      Coronary Artery Disease Father      Hypertension Father      Prostate Cancer Father      Other Cancer Father         Bladder     Cerebrovascular Disease Father      Hypertension Mother      Cancer Son         cancer of the sinus, rhabdomyoscarcoma     Breast Cancer Maternal Aunt        Social History     Tobacco Use     Smoking status: Never     Passive exposure: Never     Smokeless tobacco: Never     Tobacco comments:     no smoking in the home   Vaping Use     Vaping status: Never Used   Substance Use Topics     Alcohol use: Yes     Comment: Once or twice a month     Drug use: No     Medications:  No current outpatient medications on file.       Review Of Systems:  10 point ROS otherwise negative     OBJECTIVE:  Vitals and physical exam deferred due to phone visit.   Last vitals in  "our system 4/15/24: /76, P80; and 4/30/24: /86, P80      Renal panel, CBC, UA, UPCR, and UACR reviewed.   Recent Labs   Lab Test 08/13/24  0928 04/30/24  1124    139   POTASSIUM 3.6 4.3   CHLORIDE 107 106   CO2 23 24   ANIONGAP 10 9   * 100*   BUN 12.6 18.4   CR 0.96* 0.96*   ALVARO 9.2 9.7       Lab Results   Component Value Date    WBC 7.4 08/13/2024    WBC 5.2 01/29/2021     Lab Results   Component Value Date    RBC 4.47 08/13/2024    RBC 4.44 01/29/2021     Lab Results   Component Value Date    HGB 13.6 08/13/2024    HGB 13.7 01/29/2021     Lab Results   Component Value Date    HCT 40.7 08/13/2024    HCT 40.6 01/29/2021     No components found for: \"MCT\"  Lab Results   Component Value Date    MCV 91 08/13/2024    MCV 91 01/29/2021     Lab Results   Component Value Date    MCH 30.4 08/13/2024    MCH 30.9 01/29/2021     Lab Results   Component Value Date    MCHC 33.4 08/13/2024    MCHC 33.7 01/29/2021     Lab Results   Component Value Date    RDW 13.0 08/13/2024    RDW 12.4 01/29/2021     Lab Results   Component Value Date     08/13/2024     01/29/2021       Color Urine (no units)   Date Value   08/13/2024 Yellow   01/29/2021 Yellow     Appearance Urine (no units)   Date Value   08/13/2024 Clear   01/29/2021 Clear     Glucose Urine (mg/dL)   Date Value   08/13/2024 Negative   01/29/2021 Negative     Bilirubin Urine (no units)   Date Value   08/13/2024 Negative   01/29/2021 Negative     Ketones Urine (mg/dL)   Date Value   08/13/2024 Negative   01/29/2021 Negative     Specific Gravity Urine (no units)   Date Value   08/13/2024 1.005   01/29/2021 1.011     pH Urine   Date Value   08/13/2024 5.0   01/29/2021 5.0 pH     Protein Albumin Urine (mg/dL)   Date Value   08/13/2024 Negative   01/29/2021 Negative     Urobilinogen Urine   Date Value   04/15/2024 0.2 E.U./dL   01/12/2016 0.2 EU/dL     Nitrite Urine (no units)   Date Value   08/13/2024 Negative   01/29/2021 Negative "     Leukocyte Esterase Urine (no units)   Date Value   08/13/2024 Negative   01/29/2021 Negative             ASSESSMENT/PLAN:  Pt is a 54 year old female here to establish care.    Parrish was seen today for consult.    Diagnoses and all orders for this visit:    SHEREE (acute kidney injury) (H24)  Recovered. Unclear if pre-renal cause or related to unilateral hydronephrosis (less likely but possible; CT scan from 5/2024 suggests it could be a chronic cause). Discussed water intake, both because she may start a diuretic with her PCP and for SHEREE prevention and for kidney stone hx.   -     Adult Nephrology  Referral    Hydronephrosis, unspecified hydronephrosis type  A diagnosis of hydronephrosis on May 2024 imaging should have resulted in a urology referral (this is treated by urology, not nephrology). It is unclear if this is the cause of her SHEREE, or if this is incidental (it is unilateral, which is less likely to cause SHEREE, and the CT scan done same day as the ultrasound suggests a chronic cause for hydronephrosis).   -     Adult Nephrology  Referral  -     US Renal Complete Non-Vascular; Future    Calcium oxalate kidney stones  Edema, localized   Discussed increasing water intake for stones. Her PCP can begin hydrochlorothiazide for edema and for stone prophylaxis (thiazides decrease calcium oxalate stone risk).   Unclear if edema relates to SHEREE - it may, but her SHEREE has now recovered and she reports the edema has improved. Recommend PCP get once to twice annual BMP check on this patient. She can return to nephrology clinic if her creatinine rises again (or other nephrology concerns).     Abdominal Pain  Not renal in origin based on symptoms.     Hematuria  Seen on several UAs over several years. Never smoker. Requires urology workup. No suspicion for GN given no proteinuria (and now creatinine has improved/recovered back to <1.0).       Return to clinic as needed. No planned follow-up at this time.  Plan to refer to urology, the urgency of which pending a repeat renal U/S (hydro and hematuria referral to urology). Thus, waiting for repeat U/S results before placing urology referral order. Patient in agreement with this plan to determine urgency of referral.   Recommend PCP get once to twice annual BMP check on this patient. She can return to nephrology clinic if her creatinine rises again (or other nephrology concerns).     Edmond Saravia MD  Nephrology  Pager [phone]        Virtual Visit Details    Type of service:  Phone Visit     Originating Location (pt. Location): Other at her place of work    Distant Location (provider location):  On-site  Platform used for Virtual Visit: Other: phone call, lasting 18 minutes      Again, thank you for allowing me to participate in the care of your patient.      Sincerely,    Edmond Saravia MD

## 2024-11-07 ENCOUNTER — OFFICE VISIT (OUTPATIENT)
Dept: FAMILY MEDICINE | Facility: CLINIC | Age: 54
End: 2024-11-07
Payer: COMMERCIAL

## 2024-11-07 VITALS
SYSTOLIC BLOOD PRESSURE: 128 MMHG | HEART RATE: 86 BPM | RESPIRATION RATE: 16 BRPM | DIASTOLIC BLOOD PRESSURE: 82 MMHG | TEMPERATURE: 97.5 F | HEIGHT: 69 IN | WEIGHT: 272.06 LBS | OXYGEN SATURATION: 98 % | BODY MASS INDEX: 40.29 KG/M2

## 2024-11-07 DIAGNOSIS — N13.30 HYDRONEPHROSIS, UNSPECIFIED HYDRONEPHROSIS TYPE: Primary | ICD-10-CM

## 2024-11-07 DIAGNOSIS — R60.0 BILATERAL LEG EDEMA: ICD-10-CM

## 2024-11-07 DIAGNOSIS — Z12.31 ENCOUNTER FOR SCREENING MAMMOGRAM FOR BREAST CANCER: ICD-10-CM

## 2024-11-07 PROCEDURE — 99214 OFFICE O/P EST MOD 30 MIN: CPT | Performed by: FAMILY MEDICINE

## 2024-11-07 RX ORDER — HYDROCHLOROTHIAZIDE 25 MG/1
25 TABLET ORAL DAILY
Qty: 90 TABLET | Refills: 1 | Status: SHIPPED | OUTPATIENT
Start: 2024-11-07

## 2024-11-07 NOTE — PROGRESS NOTES
Assessment & Plan     Hydronephrosis, unspecified hydronephrosis type  Parrish Bray is a 54-year-old female who presents to clinic today in follow-up of previous visit with nephrology for hydronephrosis.  She was seen by Dr. Ms. Madison earlier this fall with noted edema.  Her renal function showed a rise in her creatinine.  Subsequent ultrasound showed left hydronephrosis.  She was referred to nephrology who recommended repeat ultrasound and possible referral to urology pending the outcomes of that.  Unfortunately that referral for both the ultrasound and urology were never placed.  Fortunately her renal function did normalize.  He had made some suggestions about possibly using hydrochlorothiazide as a diuretic as it would not promote kidney stones again.    She returns today with increasing persistent ankle edema.  She does continue to admit that she is not a good water drinker.  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    On exam she has a pleasant overweight middle-aged female in no acute distress.  Her blood pressure is 128/82.  Her pulse is 86 and regular and she is afebrile.  Respiratory rate is 16 with oxygen saturations of 98% on room air.  Her weight today is 272 pounds with a body mass index of 40.0.    HEENT exam is unremarkable.  Lungs are clear to auscultation and cardiac exam is regular.  She has 2+ edema to the mid calves bilaterally.    Assessment: 54-year-old female with known history of kidney stones and recent episode of hydronephrosis and edema.  Unfortunately a follow-up ultrasound was never completed and thus referral to urology was not completed.    Plan: Placed the referral for bilateral renal ultrasound.  Pending the outcome of that we will then refer to urology if necessary.  In the meanwhile we will start her on hydrochlorothiazide 25 mg once  "daily.    Recommend follow-up with primary care provider after this workup is complete.  - US Renal Complete Non-Vascular; Future    Bilateral leg edema  Chronic, persistent.  Will start hydrochlorothiazide 25 mg once daily.  - hydroCHLOROthiazide (HYDRODIURIL) 25 MG tablet; Take 1 tablet (25 mg) by mouth daily.    Encounter for screening mammogram for breast cancer    - MA Screen Bilateral w/[place]; Future          BMI  Estimated body mass index is 40.18 kg/m  as calculated from the following:    Height as of this encounter: 1.753 m (5' 9\").    Weight as of this encounter: 123.4 kg (272 lb 1 oz).   Weight management plan: Discussed healthy diet and exercise guidelines      FURTHER TESTING:       -Bilateral renal ultrasound  CONSULTATION/REFERRAL to possible urology  Work on weight loss  Regular exercise        Nohelia Senior is a 54 year old, presenting for the following health issues:  Edema and Weight Problem      11/7/2024     2:01 PM   Additional Questions   Roomed by Yoselin YAÑEZ MA     History of Present Illness       Reason for visit:  Follow up    She eats 2-3 servings of fruits and vegetables daily.She consumes 1 sweetened beverage(s) daily.She exercises with enough effort to increase her heart rate 9 or less minutes per day.  She exercises with enough effort to increase her heart rate 3 or less days per week.   She is taking medications regularly.     Patient Active Problem List   Diagnosis    Allergic rhinitis    CARDIOVASCULAR SCREENING; LDL GOAL LESS THAN 160    Menorrhagia    Endometriosis    Kidney stones, calcium oxalate     No current outpatient medications on file.         Chronic Kidney Disease Follow-up    Do you take any over the counter pain medicine?: No  Concern - weight gain water retention   Onset: April/may   Description: gaining weight   Intensity: moderate  Progression of Symptoms:  same  Accompanying Signs & Symptoms:   Previous history of similar problem:   Precipitating factors:    " "    Worsened by: nothing   Alleviating factors:        Improved by:   Therapies tried and outcome: was seen by sameera in April       Review of Systems  Constitutional, HEENT, cardiovascular, pulmonary, gi and gu systems are negative, except as otherwise noted.      Objective    /82   Pulse 86   Temp 97.5  F (36.4  C) (Temporal)   Resp 16   Ht 1.753 m (5' 9\")   Wt 123.4 kg (272 lb 1 oz)   LMP 01/21/2014   SpO2 98%   BMI 40.18 kg/m    Body mass index is 40.18 kg/m .  Physical Exam   GENERAL: alert and no distress  NECK: no adenopathy, no asymmetry, masses, or scars  RESP: lungs clear to auscultation - no rales, rhonchi or wheezes  CV: regular rate and rhythm, normal S1 S2, no S3 or S4, no murmur, click or rub, no peripheral edema  ABDOMEN: soft, nontender, no hepatosplenomegaly, no masses and bowel sounds normal  MS: 2+ edema to bilateral calves    Lab on 08/13/2024   Component Date Value Ref Range Status    Color Urine 08/13/2024 Yellow  Colorless, Straw, Light Yellow, Yellow Final    Appearance Urine 08/13/2024 Clear  Clear Final    Glucose Urine 08/13/2024 Negative  Negative mg/dL Final    Bilirubin Urine 08/13/2024 Negative  Negative Final    Ketones Urine 08/13/2024 Negative  Negative mg/dL Final    Specific Gravity Urine 08/13/2024 1.005  1.003 - 1.035 Final    Blood Urine 08/13/2024 Moderate (A)  Negative Final    pH Urine 08/13/2024 5.0  5.0 - 7.0 Final    Protein Albumin Urine 08/13/2024 Negative  Negative mg/dL Final    Urobilinogen Urine 08/13/2024 Normal  Normal, 2.0 mg/dL Final    Nitrite Urine 08/13/2024 Negative  Negative Final    Leukocyte Esterase Urine 08/13/2024 Negative  Negative Final    Bacteria Urine 08/13/2024 Few (A)  None Seen /HPF Final    RBC Urine 08/13/2024 1  <=2 /HPF Final    WBC Urine 08/13/2024 0  <=5 /HPF Final    Squamous Epithelials Urine 08/13/2024 1  <=1 /HPF Final    Sodium 08/13/2024 140  135 - 145 mmol/L Final    Potassium 08/13/2024 3.6  3.4 - 5.3 mmol/L Final "    Chloride 08/13/2024 107  98 - 107 mmol/L Final    Carbon Dioxide (CO2) 08/13/2024 23  22 - 29 mmol/L Final    Anion Gap 08/13/2024 10  7 - 15 mmol/L Final    Glucose 08/13/2024 133 (H)  70 - 99 mg/dL Final    Urea Nitrogen 08/13/2024 12.6  6.0 - 20.0 mg/dL Final    Creatinine 08/13/2024 0.96 (H)  0.51 - 0.95 mg/dL Final    GFR Estimate 08/13/2024 70  >60 mL/min/1.73m2 Final    eGFR calculated using 2021 CKD-EPI equation.    Calcium 08/13/2024 9.2  8.8 - 10.4 mg/dL Final    Reference intervals for this test were updated on 7/16/2024 to reflect our healthy population more accurately. There may be differences in the flagging of prior results with similar values performed with this method. Those prior results can be interpreted in the context of the updated reference intervals.    Albumin 08/13/2024 3.9  3.5 - 5.2 g/dL Final    Phosphorus 08/13/2024 3.1  2.5 - 4.5 mg/dL Final    Total Protein Urine mg/dL 08/13/2024 <6.0    mg/dL Final    The reference ranges have not been established in urine protein. The results should be integrated into the clinical context for interpretation.    Total Protein Urine mg/mg Creat 08/13/2024    Final    Unable to calculate, urine creatinine or protein is outside the detectable limits.    Creatinine Urine mg/dL 08/13/2024 55.4  mg/dL Final    The reference ranges have not been established in urine creatinine. The results should be integrated into the clinical context for interpretation.    Creatinine Urine mg/dL 08/13/2024 55.4  mg/dL Final    The reference ranges have not been established in urine creatinine. The results should be integrated into the clinical context for interpretation.  The reference ranges have not been established in urine creatinine. The results should be integrated into the clinical context for interpretation.    Albumin Urine mg/L 08/13/2024 <12.0  mg/L Final    The reference ranges have not been established in urine albumin. The results should be integrated  into the clinical context for interpretation.    Albumin Urine mg/g Cr 08/13/2024    Final    Unable to calculate, urine albumin and/or urine creatinine is outside detectable limits.  Microalbuminuria is defined as an albumin:creatinine ratio of 17 to 299 for males and 25 to 299 for females. A ratio of albumin:creatinine of 300 or higher is indicative of overt proteinuria.  Due to biologic variability, positive results should be confirmed by a second, first-morning random or 24-hour timed urine specimen. If there is discrepancy, a third specimen is recommended. When 2 out of 3 results are in the microalbuminuria range, this is evidence for incipient nephropathy and warrants increased efforts at glucose control, blood pressure control, and institution of therapy with an angiotensin-converting-enzyme (ACE) inhibitor (if the patient can tolerate it).      WBC Count 08/13/2024 7.4  4.0 - 11.0 10e3/uL Final    RBC Count 08/13/2024 4.47  3.80 - 5.20 10e6/uL Final    Hemoglobin 08/13/2024 13.6  11.7 - 15.7 g/dL Final    Hematocrit 08/13/2024 40.7  35.0 - 47.0 % Final    MCV 08/13/2024 91  78 - 100 fL Final    MCH 08/13/2024 30.4  26.5 - 33.0 pg Final    MCHC 08/13/2024 33.4  31.5 - 36.5 g/dL Final    RDW 08/13/2024 13.0  10.0 - 15.0 % Final    Platelet Count 08/13/2024 277  150 - 450 10e3/uL Final           Signed Electronically by: Kimo Phillips MD

## 2024-11-08 ENCOUNTER — PATIENT OUTREACH (OUTPATIENT)
Dept: CARE COORDINATION | Facility: CLINIC | Age: 54
End: 2024-11-08
Payer: COMMERCIAL

## 2024-11-26 ENCOUNTER — HOSPITAL ENCOUNTER (OUTPATIENT)
Dept: ULTRASOUND IMAGING | Facility: CLINIC | Age: 54
Discharge: HOME | End: 2024-11-26
Attending: FAMILY MEDICINE
Payer: COMMERCIAL

## 2024-11-26 ENCOUNTER — HOSPITAL ENCOUNTER (OUTPATIENT)
Dept: MAMMOGRAPHY | Facility: CLINIC | Age: 54
Discharge: HOME | End: 2024-11-26
Attending: FAMILY MEDICINE
Payer: COMMERCIAL

## 2024-11-26 DIAGNOSIS — N13.30 HYDRONEPHROSIS, UNSPECIFIED HYDRONEPHROSIS TYPE: ICD-10-CM

## 2024-11-26 DIAGNOSIS — Z12.31 ENCOUNTER FOR SCREENING MAMMOGRAM FOR BREAST CANCER: ICD-10-CM

## 2024-11-26 PROCEDURE — 77063 BREAST TOMOSYNTHESIS BI: CPT

## 2024-11-26 PROCEDURE — 77067 SCR MAMMO BI INCL CAD: CPT

## 2024-11-26 PROCEDURE — 76770 US EXAM ABDO BACK WALL COMP: CPT

## 2025-01-06 ENCOUNTER — PATIENT OUTREACH (OUTPATIENT)
Dept: CARE COORDINATION | Facility: CLINIC | Age: 55
End: 2025-01-06
Payer: COMMERCIAL

## 2025-01-20 ENCOUNTER — OFFICE VISIT (OUTPATIENT)
Dept: UROLOGY | Facility: CLINIC | Age: 55
End: 2025-01-20
Attending: FAMILY MEDICINE
Payer: COMMERCIAL

## 2025-01-20 VITALS
SYSTOLIC BLOOD PRESSURE: 126 MMHG | DIASTOLIC BLOOD PRESSURE: 72 MMHG | TEMPERATURE: 96.9 F | WEIGHT: 270.6 LBS | HEIGHT: 69 IN | BODY MASS INDEX: 40.08 KG/M2

## 2025-01-20 DIAGNOSIS — N13.30 HYDRONEPHROSIS, UNSPECIFIED HYDRONEPHROSIS TYPE: ICD-10-CM

## 2025-01-20 DIAGNOSIS — Z87.442 HISTORY OF RENAL STONE: Primary | ICD-10-CM

## 2025-01-20 PROCEDURE — 99243 OFF/OP CNSLTJ NEW/EST LOW 30: CPT | Performed by: UROLOGY

## 2025-01-20 ASSESSMENT — PAIN SCALES - GENERAL: PAINLEVEL_OUTOF10: MILD PAIN (1)

## 2025-01-20 NOTE — PATIENT INSTRUCTIONS
Please call imaging to schedule your CT at [phone].    Please call [phone] with any questions regarding today's visit.

## 2025-01-20 NOTE — PROGRESS NOTES
S: Patient is a pleasant 55-year-old who was requested to be seen by Dr. Kimo Phillips for a consultation with regard to patient's abnormal CT scan.  Patient had a renal ultrasound done recently that showed possible left hydronephrosis.  She has history of kidney stones but denies any renal colic.  She has no fever nausea or vomiting.  She initially had an ultrasound done for kidney that showed similar findings.  Current Outpatient Medications   Medication Sig Dispense Refill    hydroCHLOROthiazide (HYDRODIURIL) 25 MG tablet Take 1 tablet (25 mg) by mouth daily. 90 tablet 1     Allergies   Allergen Reactions    Sulfa Antibiotics Rash     Past Medical History:   Diagnosis Date    Allergic rhinitis, cause unspecified     Depressive disorder     Kidney stone     NONSPECIFIC MEDICAL HISTORY     Td due in 2019     Past Surgical History:   Procedure Laterality Date    ABDOMEN SURGERY      CYSTOSCOPY  2/7/2014    Procedure: CYSTOSCOPY;;  Surgeon: Jessy Palomares MD;  Location: [facility] OR     TOOTH EXTRACTION W/FORCEP      x5 wisdom teeth.    HYSTERECTOMY TOTAL ABDOMINAL, BILATERAL SALPINGO-OOPHORECTOMY, COMBINED  2/7/2014    Procedure: COMBINED HYSTERECTOMY TOTAL ABDOMINAL, SALPINGO-OOPHORECTOMY;;  Surgeon: Jessy Palomares MD;  Location: [facility] OR    HYSTERECTOMY, PAP NO LONGER INDICATED      LAPAROSCOPY DIAGNOSTIC (GYN)  2/7/2014    Procedure: LAPAROSCOPY DIAGNOSTIC (GYN);  Diagnositc Laparoscopy with Lysis of  Adhesions,  Exploratory Laparotomy, Total Abdominal   Hysterectomy Bilateral Salpingo-Oopherctomy, Lysis of Adhesions(open), Ureterolysis,Cystoscopy, Protoscopy.  Anesthesia General with Block;  Surgeon: Jessy Palomares MD;  Location: [facility] OR      Family History   Problem Relation Age of Onset    Cancer Father         prostate,and bladder    Heart Disease Father         stents    Arthritis Father     Coronary Artery Disease Father     Hypertension Father     Prostate Cancer Father     Other Cancer Father         Bladder     Cerebrovascular Disease Father     Hypertension Mother     Cancer Son         cancer of the sinus, rhabdomyoscarcoma    Breast Cancer Maternal Aunt      Social History     Socioeconomic History    Marital status:      Spouse name: Matias    Number of children: 2    Years of education: 16    Highest education level: None   Occupational History    Occupation:      Employer: [employer]     Comment: [place]   Tobacco Use    Smoking status: Never     Passive exposure: Never    Smokeless tobacco: Never    Tobacco comments:     no smoking in the home   Vaping Use    Vaping status: Never Used   Substance and Sexual Activity    Alcohol use: Yes     Comment: Once or twice a month    Drug use: No    Sexual activity: Yes     Partners: Male     Birth control/protection: Female Surgical     Comment: none   Other Topics Concern     Service No    Blood Transfusions No    Caffeine Concern No     Comment: occ    Occupational Exposure No    Hobby Hazards No    Sleep Concern No    Stress Concern No    Weight Concern Yes    Special Diet No    Back Care No    Exercise No    Seat Belt Yes    Self-Exams No    Parent/sibling w/ CABG, MI or angioplasty before 65F 55M? No     Social Drivers of Health     Interpersonal Safety: Low Risk  (11/7/2024)    Interpersonal Safety     Do you feel physically and emotionally safe where you currently live?: Yes     Within the past 12 months, have you been hit, slapped, kicked or otherwise physically hurt by someone?: No     Within the past 12 months, have you been humiliated or emotionally abused in other ways by your partner or ex-partner?: No       REVIEW OF SYSTEMS  =================  C: NEGATIVE for fever, chills, change in weight  I: NEGATIVE for worrisome rashes, moles or lesions  E/M: NEGATIVE for ear, mouth and throat problems  R: NEGATIVE for significant cough or SHORTNESS OF BREATH  CV:  NEGATIVE for chest pain, palpitations or peripheral edema  GI: NEGATIVE  "for nausea, abdominal pain, heartburn, or change in bowel habits  NEURO: NEGATIVE numbness/weakness  : see HPI  PSYCH: NEGATIVE depression/anxiety  LYmph: no new enlarged lymph nodes  Ortho: no new trauma/movements      Physical Exam:  /72 (BP Location: Right arm, Patient Position: Sitting, Cuff Size: Adult Large)   Temp 96.9  F (36.1  C) (Temporal)   Ht 1.753 m (5' 9\")   Wt 122.7 kg (270 lb 9.6 oz)   LMP 01/21/2014   BMI 39.96 kg/m     Patient is pleasant, in no acute distress, good general condition.  GENERAL: alert and no distress  EYES: Eyes grossly normal to inspection.  No discharge or erythema, or obvious scleral/conjunctival abnormalities.  RESP: No audible wheeze, cough, or visible cyanosis.    SKIN: Visible skin clear. No significant rash, abnormal pigmentation or lesions.  NEURO: Cranial nerves grossly intact.  Mentation and speech appropriate for age.  PSYCH: Appropriate affect, tone, and pace of words     Assessment/Plan:   Pleasant 55-year-old  female with left hydronephrosis versus parapelvic cyst.  I compared her most recent CT scan to the one that she had in 2021.  I suspect this is most likely a parapelvic cyst however will schedule patient for a CT urogram next for confirmation.       "

## 2025-02-11 ENCOUNTER — HOSPITAL ENCOUNTER (OUTPATIENT)
Dept: MAMMOGRAPHY | Facility: CLINIC | Age: 55
Discharge: HOME | End: 2025-02-11
Attending: FAMILY MEDICINE
Payer: COMMERCIAL

## 2025-02-11 ENCOUNTER — HOSPITAL ENCOUNTER (OUTPATIENT)
Dept: ULTRASOUND IMAGING | Facility: CLINIC | Age: 55
Discharge: HOME | End: 2025-02-11
Attending: FAMILY MEDICINE
Payer: COMMERCIAL

## 2025-02-11 DIAGNOSIS — R92.8 ABNORMAL MAMMOGRAM: ICD-10-CM

## 2025-02-11 PROCEDURE — 76642 ULTRASOUND BREAST LIMITED: CPT | Mod: LT

## 2025-02-11 PROCEDURE — 77065 DX MAMMO INCL CAD UNI: CPT | Mod: LT

## 2025-02-17 ENCOUNTER — HOSPITAL ENCOUNTER (OUTPATIENT)
Dept: CT IMAGING | Facility: CLINIC | Age: 55
Discharge: HOME | End: 2025-02-17
Attending: UROLOGY | Admitting: UROLOGY
Payer: COMMERCIAL

## 2025-02-17 DIAGNOSIS — Z87.442 HISTORY OF RENAL STONE: ICD-10-CM

## 2025-02-17 DIAGNOSIS — N13.30 HYDRONEPHROSIS, UNSPECIFIED HYDRONEPHROSIS TYPE: ICD-10-CM

## 2025-02-17 PROCEDURE — 74178 CT ABD&PLV WO CNTR FLWD CNTR: CPT

## 2025-02-17 PROCEDURE — 250N000011 HC RX IP 250 OP 636: Performed by: UROLOGY

## 2025-02-17 PROCEDURE — 250N000011 HC RX IP 250 OP 636: Performed by: FAMILY MEDICINE

## 2025-02-17 PROCEDURE — 250N000009 HC RX 250: Performed by: UROLOGY

## 2025-02-17 RX ORDER — DIPHENHYDRAMINE HYDROCHLORIDE 50 MG/ML
25 INJECTION INTRAMUSCULAR; INTRAVENOUS ONCE
Status: COMPLETED | OUTPATIENT
Start: 2025-02-17 | End: 2025-02-17

## 2025-02-17 RX ORDER — IOPAMIDOL 755 MG/ML
500 INJECTION, SOLUTION INTRAVASCULAR ONCE
Status: COMPLETED | OUTPATIENT
Start: 2025-02-17 | End: 2025-02-17

## 2025-02-17 RX ORDER — DEXAMETHASONE SODIUM PHOSPHATE 10 MG/ML
10 INJECTION, SOLUTION INTRA-ARTICULAR; INTRALESIONAL; INTRAMUSCULAR; INTRAVENOUS; SOFT TISSUE ONCE
Status: COMPLETED | OUTPATIENT
Start: 2025-02-17 | End: 2025-02-17

## 2025-02-17 RX ADMIN — IOPAMIDOL 100 ML: 755 INJECTION, SOLUTION INTRAVENOUS at 08:32

## 2025-02-17 RX ADMIN — DEXAMETHASONE SODIUM PHOSPHATE 10 MG: 10 INJECTION, SOLUTION INTRAMUSCULAR; INTRAVENOUS at 09:49

## 2025-02-17 RX ADMIN — DIPHENHYDRAMINE HYDROCHLORIDE 25 MG: 50 INJECTION INTRAMUSCULAR; INTRAVENOUS at 08:53

## 2025-02-17 RX ADMIN — SODIUM CHLORIDE 100 ML: 9 INJECTION, SOLUTION INTRAVENOUS at 08:32

## 2025-02-17 RX ADMIN — DIPHENHYDRAMINE HYDROCHLORIDE 25 MG: 50 INJECTION INTRAMUSCULAR; INTRAVENOUS at 09:21

## 2025-02-17 NOTE — PROGRESS NOTES
Patient arrived today for a Urogram w/o and w/ contrast (Isovue-370, 100mL).  Patient has not had contrast prior.  Half way through the exam, patients eye, face and throat began to feel itchy.  I consulted Dr. Barnes in the ER, he instructed PRINCESS VILLANUEVA to administered 25mg of Benadryl (due to not having a ), after 15 minutes, symptoms continued. 25mg more was administered by IV, after another 15 min, still no relief.  10mg of Dexamethasone was administered given by Marissa Bartholomew.  Patient contacted someone for a ride.  Patients boyfriend did arrive.   After 30min, patient symptoms were better, but still itchy.  Patient was instructed by Dr. Barnes to  Benadryl from the pharmacy and if symptoms persisted to take another 50mg every 5 hours until gone.  If symptoms get worse, come into the ER.  Patients chart was updated for allergy and an ICARE filled out.     50mg Benadryl given by IV  10mg Dexamethasone given by IV  Instructed to take Benadryl every 5 hours, 50mg until symptoms resolve.  Do not drive while taking Benadryl per Dr. Barnes.     Mi Fish, STEFF  2/17/2025  11:17 AM     Detail Level: Simple Instructions: This plan will send the code FBSD to the PM system.  DO NOT or CHANGE the price. Price (Do Not Change): 0.00

## 2025-03-22 ENCOUNTER — HEALTH MAINTENANCE LETTER (OUTPATIENT)
Age: 55
End: 2025-03-22